# Patient Record
Sex: MALE | Race: WHITE | NOT HISPANIC OR LATINO
[De-identification: names, ages, dates, MRNs, and addresses within clinical notes are randomized per-mention and may not be internally consistent; named-entity substitution may affect disease eponyms.]

---

## 2020-07-22 ENCOUNTER — APPOINTMENT (OUTPATIENT)
Dept: OPHTHALMOLOGY | Facility: CLINIC | Age: 85
End: 2020-07-22
Payer: MEDICARE

## 2020-07-22 ENCOUNTER — NON-APPOINTMENT (OUTPATIENT)
Age: 85
End: 2020-07-22

## 2020-07-22 PROCEDURE — 92133 CPTRZD OPH DX IMG PST SGM ON: CPT

## 2020-07-22 PROCEDURE — 92014 COMPRE OPH EXAM EST PT 1/>: CPT | Mod: 25

## 2020-07-22 PROCEDURE — 92202 OPSCPY EXTND ON/MAC DRAW: CPT

## 2020-07-22 PROCEDURE — 68761 CLOSE TEAR DUCT OPENING: CPT | Mod: E2,E4

## 2020-11-10 ENCOUNTER — NON-APPOINTMENT (OUTPATIENT)
Age: 85
End: 2020-11-10

## 2020-11-10 ENCOUNTER — APPOINTMENT (OUTPATIENT)
Dept: OPHTHALMOLOGY | Facility: CLINIC | Age: 85
End: 2020-11-10
Payer: MEDICARE

## 2020-11-10 PROCEDURE — 92012 INTRM OPH EXAM EST PATIENT: CPT

## 2020-11-10 PROCEDURE — 92083 EXTENDED VISUAL FIELD XM: CPT

## 2021-03-12 ENCOUNTER — APPOINTMENT (OUTPATIENT)
Dept: OPHTHALMOLOGY | Facility: CLINIC | Age: 86
End: 2021-03-12

## 2021-04-22 ENCOUNTER — APPOINTMENT (OUTPATIENT)
Dept: OPHTHALMOLOGY | Facility: CLINIC | Age: 86
End: 2021-04-22
Payer: MEDICARE

## 2021-04-22 ENCOUNTER — NON-APPOINTMENT (OUTPATIENT)
Age: 86
End: 2021-04-22

## 2021-04-22 PROCEDURE — 68761 CLOSE TEAR DUCT OPENING: CPT | Mod: E2,E4

## 2021-04-22 PROCEDURE — 92202 OPSCPY EXTND ON/MAC DRAW: CPT

## 2021-04-22 PROCEDURE — 92014 COMPRE OPH EXAM EST PT 1/>: CPT | Mod: 25

## 2021-04-22 PROCEDURE — 92133 CPTRZD OPH DX IMG PST SGM ON: CPT

## 2021-05-17 ENCOUNTER — APPOINTMENT (OUTPATIENT)
Dept: OPHTHALMOLOGY | Facility: CLINIC | Age: 86
End: 2021-05-17
Payer: MEDICARE

## 2021-05-17 ENCOUNTER — NON-APPOINTMENT (OUTPATIENT)
Age: 86
End: 2021-05-17

## 2021-05-17 PROCEDURE — 92083 EXTENDED VISUAL FIELD XM: CPT

## 2021-05-17 PROCEDURE — 92012 INTRM OPH EXAM EST PATIENT: CPT

## 2021-05-26 ENCOUNTER — EMERGENCY (EMERGENCY)
Facility: HOSPITAL | Age: 86
LOS: 1 days | Discharge: HOME | End: 2021-05-26
Attending: EMERGENCY MEDICINE
Payer: MEDICARE

## 2021-05-26 VITALS
DIASTOLIC BLOOD PRESSURE: 87 MMHG | HEART RATE: 69 BPM | SYSTOLIC BLOOD PRESSURE: 153 MMHG | OXYGEN SATURATION: 99 % | TEMPERATURE: 98 F | RESPIRATION RATE: 18 BRPM

## 2021-05-26 VITALS
WEIGHT: 141.1 LBS | HEIGHT: 65 IN | SYSTOLIC BLOOD PRESSURE: 127 MMHG | RESPIRATION RATE: 18 BRPM | OXYGEN SATURATION: 100 % | DIASTOLIC BLOOD PRESSURE: 69 MMHG | TEMPERATURE: 98 F | HEART RATE: 64 BPM

## 2021-05-26 LAB
ALBUMIN SERPL ELPH-MCNC: 3.7 G/DL — SIGNIFICANT CHANGE UP (ref 3.3–5)
ALP SERPL-CCNC: 46 U/L — SIGNIFICANT CHANGE UP (ref 40–120)
ALT FLD-CCNC: 14 U/L — SIGNIFICANT CHANGE UP (ref 10–45)
ANION GAP SERPL CALC-SCNC: 13 MMOL/L — SIGNIFICANT CHANGE UP (ref 5–17)
APTT BLD: 33.6 SEC — SIGNIFICANT CHANGE UP (ref 27.5–35.5)
AST SERPL-CCNC: 23 U/L — SIGNIFICANT CHANGE UP (ref 10–40)
B PERT IGG+IGM PNL SER: ABNORMAL
BASOPHILS # BLD AUTO: 0.03 K/UL — SIGNIFICANT CHANGE UP (ref 0–0.2)
BASOPHILS NFR BLD AUTO: 0.4 % — SIGNIFICANT CHANGE UP (ref 0–2)
BILIRUB SERPL-MCNC: 0.7 MG/DL — SIGNIFICANT CHANGE UP (ref 0.2–1.2)
BUN SERPL-MCNC: 26 MG/DL — HIGH (ref 7–23)
CALCIUM SERPL-MCNC: 8.9 MG/DL — SIGNIFICANT CHANGE UP (ref 8.4–10.5)
CHLORIDE SERPL-SCNC: 103 MMOL/L — SIGNIFICANT CHANGE UP (ref 96–108)
CO2 SERPL-SCNC: 23 MMOL/L — SIGNIFICANT CHANGE UP (ref 22–31)
COLOR FLD: SIGNIFICANT CHANGE UP
CREAT SERPL-MCNC: 0.83 MG/DL — SIGNIFICANT CHANGE UP (ref 0.5–1.3)
EOSINOPHIL # BLD AUTO: 0.06 K/UL — SIGNIFICANT CHANGE UP (ref 0–0.5)
EOSINOPHIL NFR BLD AUTO: 0.8 % — SIGNIFICANT CHANGE UP (ref 0–6)
FLUID INTAKE SUBSTANCE CLASS: SIGNIFICANT CHANGE UP
GLUCOSE SERPL-MCNC: 88 MG/DL — SIGNIFICANT CHANGE UP (ref 70–99)
HCT VFR BLD CALC: 41 % — SIGNIFICANT CHANGE UP (ref 39–50)
HGB BLD-MCNC: 13.6 G/DL — SIGNIFICANT CHANGE UP (ref 13–17)
IMM GRANULOCYTES NFR BLD AUTO: 0.3 % — SIGNIFICANT CHANGE UP (ref 0–1.5)
INR BLD: 1.26 RATIO — HIGH (ref 0.88–1.16)
LYMPHOCYTES # BLD AUTO: 1.27 K/UL — SIGNIFICANT CHANGE UP (ref 1–3.3)
LYMPHOCYTES # BLD AUTO: 17.9 % — SIGNIFICANT CHANGE UP (ref 13–44)
MCHC RBC-ENTMCNC: 33.1 PG — SIGNIFICANT CHANGE UP (ref 27–34)
MCHC RBC-ENTMCNC: 33.2 GM/DL — SIGNIFICANT CHANGE UP (ref 32–36)
MCV RBC AUTO: 99.8 FL — SIGNIFICANT CHANGE UP (ref 80–100)
MONOCYTES # BLD AUTO: 0.68 K/UL — SIGNIFICANT CHANGE UP (ref 0–0.9)
MONOCYTES NFR BLD AUTO: 9.6 % — SIGNIFICANT CHANGE UP (ref 2–14)
NEUTROPHILS # BLD AUTO: 5.04 K/UL — SIGNIFICANT CHANGE UP (ref 1.8–7.4)
NEUTROPHILS NFR BLD AUTO: 71 % — SIGNIFICANT CHANGE UP (ref 43–77)
NRBC # BLD: 0 /100 WBCS — SIGNIFICANT CHANGE UP (ref 0–0)
PLATELET # BLD AUTO: 146 K/UL — LOW (ref 150–400)
POTASSIUM SERPL-MCNC: 4.3 MMOL/L — SIGNIFICANT CHANGE UP (ref 3.5–5.3)
POTASSIUM SERPL-SCNC: 4.3 MMOL/L — SIGNIFICANT CHANGE UP (ref 3.5–5.3)
PROT SERPL-MCNC: 6.6 G/DL — SIGNIFICANT CHANGE UP (ref 6–8.3)
PROTHROM AB SERPL-ACNC: 14.9 SEC — HIGH (ref 10.6–13.6)
RBC # BLD: 4.11 M/UL — LOW (ref 4.2–5.8)
RBC # FLD: 12.5 % — SIGNIFICANT CHANGE UP (ref 10.3–14.5)
RCV VOL RI: HIGH /UL (ref 0–0)
SODIUM SERPL-SCNC: 139 MMOL/L — SIGNIFICANT CHANGE UP (ref 135–145)
SYNOVIAL CRYSTALS CLARITY: ABNORMAL
SYNOVIAL CRYSTALS COLOR: ABNORMAL
SYNOVIAL CRYSTALS ID: SIGNIFICANT CHANGE UP
SYNOVIAL CRYSTALS TUBE: SIGNIFICANT CHANGE UP
TOTAL NUCLEATED CELL COUNT, BODY FLUID: 1111 /UL — SIGNIFICANT CHANGE UP
TUBE TYPE: SIGNIFICANT CHANGE UP
WBC # BLD: 7.1 K/UL — SIGNIFICANT CHANGE UP (ref 3.8–10.5)
WBC # FLD AUTO: 7.1 K/UL — SIGNIFICANT CHANGE UP (ref 3.8–10.5)

## 2021-05-26 PROCEDURE — 87075 CULTR BACTERIA EXCEPT BLOOD: CPT

## 2021-05-26 PROCEDURE — 85025 COMPLETE CBC W/AUTO DIFF WBC: CPT

## 2021-05-26 PROCEDURE — 99284 EMERGENCY DEPT VISIT MOD MDM: CPT | Mod: 25

## 2021-05-26 PROCEDURE — 87070 CULTURE OTHR SPECIMN AEROBIC: CPT

## 2021-05-26 PROCEDURE — 99284 EMERGENCY DEPT VISIT MOD MDM: CPT | Mod: GC

## 2021-05-26 PROCEDURE — 86140 C-REACTIVE PROTEIN: CPT

## 2021-05-26 PROCEDURE — 73564 X-RAY EXAM KNEE 4 OR MORE: CPT

## 2021-05-26 PROCEDURE — 89060 EXAM SYNOVIAL FLUID CRYSTALS: CPT

## 2021-05-26 PROCEDURE — 85730 THROMBOPLASTIN TIME PARTIAL: CPT

## 2021-05-26 PROCEDURE — 80053 COMPREHEN METABOLIC PANEL: CPT

## 2021-05-26 PROCEDURE — 20610 DRAIN/INJ JOINT/BURSA W/O US: CPT | Mod: RT

## 2021-05-26 PROCEDURE — 85652 RBC SED RATE AUTOMATED: CPT

## 2021-05-26 PROCEDURE — 87205 SMEAR GRAM STAIN: CPT

## 2021-05-26 PROCEDURE — 85610 PROTHROMBIN TIME: CPT

## 2021-05-26 PROCEDURE — 73564 X-RAY EXAM KNEE 4 OR MORE: CPT | Mod: 26,RT

## 2021-05-26 PROCEDURE — 89051 BODY FLUID CELL COUNT: CPT

## 2021-05-26 NOTE — ED PROVIDER NOTE - CLINICAL SUMMARY MEDICAL DECISION MAKING FREE TEXT BOX
Presenting with right knee pain. S/p arthroplasty 5 years ago.   Plan: xray right knee, labs, ortho consult Presenting with right knee pain. S/p arthroplasty 5 years ago.   Plan: xray right knee, labs, ortho consult    Attending Statement: Agree with the above.  Atraumatic R knee effusion, on AC for afib, s/p tka 5 yr ago likely 2/2 hemarthrosis.  No concern for septic joint.  Joint is tight, very limited range of motion, will need therapeutic arthrocentesis after XR to ensure no occult periprosthetic fx or dislocation (very low concern for both).  --BMM

## 2021-05-26 NOTE — ED PROVIDER NOTE - NSFOLLOWUPINSTRUCTIONS_ED_ALL_ED_FT
You were evaluated in the Emergency Department for [INSERT CC HERE].  You were evaluated and examined by a physician, and you had [LIST: LABS, XRAYS, CT, US, ETC].      Based on your evaluation:___________     There are no signs of emergency conditions requiring admission to the hospital on today's workup.  Based on the evaluation, a presumptive diagnosis was made, however, further evaluation may be required by your primary care physician or a specialist for a more definitive diagnosis.  Therefore, please follow-up as directed or return to the Emergency Department if your symptoms change or worsen.    We recommend that you:  1. See your primary care physician within the next 72 hours for follow up.  Bring a copy of your discharge paperwork (including any test results) to your doctor.  2. Please take 500mg Tylenol every 8 hours as needed for pain  3. Please keep the Ace wrap on unless showering  4. Please follow up with your orthopedic Doctor. Dr. Brown within 5-7 days.      *** Return immediately if you have worsening symptoms, worsening pain, chest pain, Shortness of Breath, abdominal pain, Nausea/Vomiting/Diarrhea, dizziness, weakness, confusion, vision changes, urinary symptoms, syncope, falls, trauma, discharge, bleeding, fevers, or any other new/concerning symptoms. ***

## 2021-05-26 NOTE — ED PROVIDER NOTE - MUSCULOSKELETAL, MLM
Right knee: Right knee: moderate effusion noted. Tenderness to medial joint line. Limited ROM secondary to pain, flexion to 90 degrees, extension to 20 degrees. Midline surgical scar at anterior knee. No erythema.

## 2021-05-26 NOTE — CONSULT NOTE ADULT - SUBJECTIVE AND OBJECTIVE BOX
Orthopaedic Surgery Consult Note    Pt is a 89y male presenting with R knee pain of 3 hour duration. Pt was standing at a store earlier today and felt immediate pain in his right knee. Pt has had R TKA with Dr. Armstrong 5 years ago and L TKA with Dr. Lepe about 10 years ago. Pt also takes Eliquis for afib, but states he hasn't been taking it as frequently because it's expensive. Pt is a community ambulator at baseline. Pt has been unable to bear weight on affected extremity since the initiation of knee pain earlier. Pt denies numbness, tingling, or paresthesias in affected limb. Pt denies recent trauma and denies any other orthopaedic injuries at this time. Pt denies pain or involvement of any other joint. Denies fevers, recent illness, dizziness, CP, SOB, N/V, calf pain.    PMHx:  No pertinent family history in first degree relatives    MEWS Score    No pertinent past medical history    HTN (hypertension)    Hypothyroid    Afib    No significant past surgical history    KNEE PAIN    90+    SysAdmin_VisitLink      PSHx:    Allergies:  PC Pen VK (Hives)    Medications:    Social: Denies smoking, EtOH, illicit drug use.    Labs:                        13.6   7.10  )-----------( 146      ( 26 May 2021 20:35 )             41.0     05-26    139  |  103  |  26<H>  ----------------------------<  88  4.3   |  23  |  0.83    Ca    8.9      26 May 2021 20:35    TPro  6.6  /  Alb  3.7  /  TBili  0.7  /  DBili  x   /  AST  23  /  ALT  14  /  AlkPhos  46  05-26    PT/INR - ( 26 May 2021 20:35 )   PT: 14.9 sec;   INR: 1.26 ratio         PTT - ( 26 May 2021 20:35 )  PTT:33.6 sec    Imaging:  XR R Knee - prostheses well aligned without fracture or loosening, large effusion noted    Vitals:    Physical Exam:  Gen: NAD, AAOx3    RLE:  Skin intact  +Effusion  No edema, erythema, ecchymosis  Mild warmth to touch  No gross deformity  TTP about knee  No bony TTP of Hip/Foot/Toes  Unable to SLR due to pain  ROM at knee limited secondary to pain to 0-10 degrees  Tender with terminal ROM of knee  Non-tender with micromotion of knee  Non-tender with logroll  +EHL/FHL/TA/GS  SILT L2-S1  +DP/PT Pulses  Compartments soft and compressible  No calf TTP B/L    Secondary Assessment:  NC/AT, NTTP of clavicles, NTTP of C-,T-,L-Spine, NTTP of Pelvis  UEs: NTTP of Shoulders, Elbows, Wrists, Hands; NT with AROM/PROM of Shoulders, Elbows, Wrists, Hands; AIN/PIN/Med/Uln/Msc/Rad/Ax intact  LLE: Able to SLR, NT with Log Roll, NT with Heel Strike, NTTP of Hip, Knee, Ankle, Foot; NT with AROM/PROM of Hip, Knee, Ankle, Foot; Q/H/Gsc/TA/EHL/FHL intact    Procedure:  Discussed risks, benefits, and alternatives to procedure with patient at bedside. Pt expressed understanding and all questions were answered. Under sterile technique, R knee was prepped and 70cc of bloody fluid was aspirated through lateral suprapatellar approach. Samples of fluid were sent for gram stain, culture, crystals, and cell count. Patient tolerated procedure well. No complications. Pt was placed in a compressive dressing following procedure.     A/P: Pt is a 89y male with R knee hemarthrosis    -Afebrile, no leukocytosis  -Very low likelihood of septic arthritis; uncomplicated hemarthrosis; patient's exam immediately improved upon aspiration, able to flex to 100 degrees without pain  -70cc bloody fluid aspirated at bedside  -FU Gram stain/culture/crystals/cell count  -FU ESR/CRP  -NPO except meds and hold all chemical dvt prophylaxis until cell count resulted  -SCDs  -WBAT RLE  -PT/OT  -Patient may be discharged once cell count results  -Discussed with Dr. Hernandez who agrees with plan    James Parsons D.O.  PGY-2 Orthopaedic Surgery

## 2021-05-26 NOTE — ED PROVIDER NOTE - OBJECTIVE STATEMENT
88 y/o male with a h/o HTN, afib, hypothyroidism, and s/p knee arthoplasty, presenting with right knee pain, which started 3 hours ago. Patient states he was at the grocery store when he started having right knee pain; pt states he did not hear a "pop". Acute onset, moderate severity, localized to medial knee and radiating to lower leg, associated symptoms include right knee swelling. Pt states he was unable to ambulate after the pain started. Denies any LE numbness/tingling, ankle pain, back pain, hip pain, fever, or chills.

## 2021-05-26 NOTE — ED PROVIDER NOTE - PATIENT PORTAL LINK FT
You can access the FollowMyHealth Patient Portal offered by [facility] by registering at the following website: [email or web address]. By joining [employer]’s FollowMyHealth portal, you will also be able to view your health information using other applications (apps) compatible with our system.

## 2021-05-26 NOTE — ED ADULT NURSE NOTE - OBJECTIVE STATEMENT
pt is 85 y/o male A&Ox4 from home coming to the ED after experiencing knee pain at the grocery store.  pt states he was able to bare weight to walk to his car to see if the pain resolved, pt states pain worsened and he called EMS to bring him to [facility]. states he was just standing in line when pain started, pt did not hear a "pop" of his knee, no swelling noted.  PMH includes A fib, HTN, b/l knee replacements. 5 hernia surgeries.  pt takes Eliquis daily.  pt complaints of pain to the back of the knee that travels down to the foot.  pt denies CP, SOB, abd discomfort, abd is soft and non tender, denies fever, chills, n/v/d.

## 2021-05-26 NOTE — ED ADULT NURSE REASSESSMENT NOTE - NS ED NURSE REASSESS COMMENT FT1
Pt states R knee pain has improved after it was aspirated. Is now able to bend and straighten knee/leg without pain or difficultly. Awaiting dispo.

## 2021-05-26 NOTE — ED PROVIDER NOTE - CARE PROVIDER_API CALL
David Brown  ORTHOPAEDIC SURGERY  [address] [place] [address], [address]  [place] [address]  Phone: [phone]  Fax: [phone]  Follow Up Time: 4-6 Days

## 2021-05-27 ENCOUNTER — APPOINTMENT (OUTPATIENT)
Dept: OPHTHALMOLOGY | Facility: CLINIC | Age: 86
End: 2021-05-27
Payer: MEDICARE

## 2021-05-27 ENCOUNTER — NON-APPOINTMENT (OUTPATIENT)
Age: 86
End: 2021-05-27

## 2021-05-27 LAB
EOSINOPHIL # FLD: 2 % — SIGNIFICANT CHANGE UP
FLUID SEGMENTED GRANULOCYTES: 80 % — SIGNIFICANT CHANGE UP
GRAM STN FLD: SIGNIFICANT CHANGE UP
LYMPHOCYTES # FLD: 11 % — SIGNIFICANT CHANGE UP
MONOS+MACROS # FLD: 7 % — SIGNIFICANT CHANGE UP
SPECIMEN SOURCE: SIGNIFICANT CHANGE UP

## 2021-05-27 PROCEDURE — 76514 ECHO EXAM OF EYE THICKNESS: CPT

## 2021-05-27 PROCEDURE — 92012 INTRM OPH EXAM EST PATIENT: CPT

## 2021-06-10 LAB
CULTURE RESULTS: SIGNIFICANT CHANGE UP
SPECIMEN SOURCE: SIGNIFICANT CHANGE UP

## 2021-06-18 ENCOUNTER — INPATIENT (INPATIENT)
Facility: HOSPITAL | Age: 86
LOS: 0 days | Discharge: HOME | DRG: 554 | End: 2021-06-19
Attending: HOSPITALIST | Admitting: HOSPITALIST
Payer: COMMERCIAL

## 2021-06-18 VITALS
HEART RATE: 63 BPM | TEMPERATURE: 97 F | HEIGHT: 65 IN | OXYGEN SATURATION: 98 % | DIASTOLIC BLOOD PRESSURE: 78 MMHG | SYSTOLIC BLOOD PRESSURE: 194 MMHG | RESPIRATION RATE: 20 BRPM | WEIGHT: 139.99 LBS

## 2021-06-18 LAB
ALBUMIN SERPL ELPH-MCNC: 4.1 G/DL — SIGNIFICANT CHANGE UP (ref 3.3–5)
ALP SERPL-CCNC: 48 U/L — SIGNIFICANT CHANGE UP (ref 40–120)
ALT FLD-CCNC: 14 U/L — SIGNIFICANT CHANGE UP (ref 10–45)
ANION GAP SERPL CALC-SCNC: 11 MMOL/L — SIGNIFICANT CHANGE UP (ref 5–17)
APTT BLD: 31.2 SEC — SIGNIFICANT CHANGE UP (ref 27.5–35.5)
AST SERPL-CCNC: 22 U/L — SIGNIFICANT CHANGE UP (ref 10–40)
BASOPHILS # BLD AUTO: 0.05 K/UL — SIGNIFICANT CHANGE UP (ref 0–0.2)
BASOPHILS NFR BLD AUTO: 0.6 % — SIGNIFICANT CHANGE UP (ref 0–2)
BILIRUB SERPL-MCNC: 0.8 MG/DL — SIGNIFICANT CHANGE UP (ref 0.2–1.2)
BUN SERPL-MCNC: 19 MG/DL — SIGNIFICANT CHANGE UP (ref 7–23)
CALCIUM SERPL-MCNC: 9 MG/DL — SIGNIFICANT CHANGE UP (ref 8.4–10.5)
CHLORIDE SERPL-SCNC: 104 MMOL/L — SIGNIFICANT CHANGE UP (ref 96–108)
CO2 SERPL-SCNC: 25 MMOL/L — SIGNIFICANT CHANGE UP (ref 22–31)
CREAT SERPL-MCNC: 0.82 MG/DL — SIGNIFICANT CHANGE UP (ref 0.5–1.3)
EOSINOPHIL # BLD AUTO: 0.07 K/UL — SIGNIFICANT CHANGE UP (ref 0–0.5)
EOSINOPHIL NFR BLD AUTO: 0.8 % — SIGNIFICANT CHANGE UP (ref 0–6)
GLUCOSE SERPL-MCNC: 105 MG/DL — HIGH (ref 70–99)
HCT VFR BLD CALC: 40.4 % — SIGNIFICANT CHANGE UP (ref 39–50)
HGB BLD-MCNC: 13.5 G/DL — SIGNIFICANT CHANGE UP (ref 13–17)
IMM GRANULOCYTES NFR BLD AUTO: 0.5 % — SIGNIFICANT CHANGE UP (ref 0–1.5)
INR BLD: 1.15 RATIO — SIGNIFICANT CHANGE UP (ref 0.88–1.16)
LYMPHOCYTES # BLD AUTO: 2.04 K/UL — SIGNIFICANT CHANGE UP (ref 1–3.3)
LYMPHOCYTES # BLD AUTO: 24.1 % — SIGNIFICANT CHANGE UP (ref 13–44)
MCHC RBC-ENTMCNC: 33.3 PG — SIGNIFICANT CHANGE UP (ref 27–34)
MCHC RBC-ENTMCNC: 33.4 GM/DL — SIGNIFICANT CHANGE UP (ref 32–36)
MCV RBC AUTO: 99.5 FL — SIGNIFICANT CHANGE UP (ref 80–100)
MONOCYTES # BLD AUTO: 0.6 K/UL — SIGNIFICANT CHANGE UP (ref 0–0.9)
MONOCYTES NFR BLD AUTO: 7.1 % — SIGNIFICANT CHANGE UP (ref 2–14)
NEUTROPHILS # BLD AUTO: 5.66 K/UL — SIGNIFICANT CHANGE UP (ref 1.8–7.4)
NEUTROPHILS NFR BLD AUTO: 66.9 % — SIGNIFICANT CHANGE UP (ref 43–77)
NRBC # BLD: 0 /100 WBCS — SIGNIFICANT CHANGE UP (ref 0–0)
PLATELET # BLD AUTO: 158 K/UL — SIGNIFICANT CHANGE UP (ref 150–400)
POTASSIUM SERPL-MCNC: 4.1 MMOL/L — SIGNIFICANT CHANGE UP (ref 3.5–5.3)
POTASSIUM SERPL-SCNC: 4.1 MMOL/L — SIGNIFICANT CHANGE UP (ref 3.5–5.3)
PROT SERPL-MCNC: 7 G/DL — SIGNIFICANT CHANGE UP (ref 6–8.3)
PROTHROM AB SERPL-ACNC: 13.7 SEC — HIGH (ref 10.6–13.6)
RBC # BLD: 4.06 M/UL — LOW (ref 4.2–5.8)
RBC # FLD: 12.5 % — SIGNIFICANT CHANGE UP (ref 10.3–14.5)
SODIUM SERPL-SCNC: 140 MMOL/L — SIGNIFICANT CHANGE UP (ref 135–145)
WBC # BLD: 8.46 K/UL — SIGNIFICANT CHANGE UP (ref 3.8–10.5)
WBC # FLD AUTO: 8.46 K/UL — SIGNIFICANT CHANGE UP (ref 3.8–10.5)

## 2021-06-18 PROCEDURE — 73562 X-RAY EXAM OF KNEE 3: CPT | Mod: 26,RT

## 2021-06-18 PROCEDURE — 99285 EMERGENCY DEPT VISIT HI MDM: CPT | Mod: GC

## 2021-06-18 PROCEDURE — 93010 ELECTROCARDIOGRAM REPORT: CPT

## 2021-06-18 RX ORDER — ACETAMINOPHEN 500 MG
975 TABLET ORAL ONCE
Refills: 0 | Status: COMPLETED | OUTPATIENT
Start: 2021-06-18 | End: 2021-06-18

## 2021-06-18 RX ORDER — AMLODIPINE BESYLATE 2.5 MG/1
5 TABLET ORAL ONCE
Refills: 0 | Status: COMPLETED | OUTPATIENT
Start: 2021-06-18 | End: 2021-06-18

## 2021-06-18 RX ORDER — MORPHINE SULFATE 50 MG/1
2 CAPSULE, EXTENDED RELEASE ORAL ONCE
Refills: 0 | Status: DISCONTINUED | OUTPATIENT
Start: 2021-06-18 | End: 2021-06-18

## 2021-06-18 RX ADMIN — MORPHINE SULFATE 2 MILLIGRAM(S): 50 CAPSULE, EXTENDED RELEASE ORAL at 23:23

## 2021-06-18 RX ADMIN — AMLODIPINE BESYLATE 5 MILLIGRAM(S): 2.5 TABLET ORAL at 22:32

## 2021-06-18 RX ADMIN — Medication 975 MILLIGRAM(S): at 23:24

## 2021-06-18 NOTE — ED ADULT NURSE REASSESSMENT NOTE - NS ED NURSE REASSESS COMMENT FT1
Received report from Jannie BRAND. Patient resting in gold salter 6. Patient taken off bed pan. Patient had 1 large brown BM. VSS. A&Ox4. Patient denies new onset of pain. Patient given pain medication as ordered.  IV patent and flows without difficulty. Soiled sheets changed. Patient cleaned and repositioned in bed. Stretcher in lowest position. Instructed to call RN if anything is needed. Side rails up. Pending medication to take effect to attempt to walk patient as per MD order.

## 2021-06-18 NOTE — ED ADULT NURSE NOTE - OBJECTIVE STATEMENT
89 y/o male with PMH HTN, hypothyroid, afib, non-compliant on meds, BIBEMS presenting to ED for right knee pain that started this afternoon. Per pt, the same thing happened 1 month ago and fluid was aspirated. Today, after swimming he felt pain and swelling to his right knee, unable to bear weight. Upon exam pt A&Ox3 gross neuro intact,  no difficulty speaking in complete sentences, s1s2 heart sounds heard, pulses x 4, aguilera x4, abdomen soft nontender nondistended, swelling to right knee noted. Pt denies chest pain, sob, ha, n/v/d, abdominal pain, f/c, urinary symptoms, hematuria.

## 2021-06-18 NOTE — ED PROVIDER NOTE - CLINICAL SUMMARY MEDICAL DECISION MAKING FREE TEXT BOX
89yo male with pmh hypothyroidism, htn, afib on eliquis, b/l TKR, presenting with R knee effusion.  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  Likely reaccumulation of previous hemarthrosis drained here by orthopedics.  Will get xrays to r/o fracture and discuss with ortho. 89yo male with pmh hypothyroidism, htn, afib on eliquis, b/l TKR, presenting with R knee effusion.  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  Likely reaccumulation of previous hemarthrosis drained here by orthopedics.  Will get xrays to r/o fracture and discuss with ortho. ZR

## 2021-06-18 NOTE — ED ADULT TRIAGE NOTE - CHIEF COMPLAINT QUOTE
As per patient and EMS, pt was swimming and when getting out of pool, pt's right knee was swollen. Pt states that he had knee swelling drained a few weeks ago. Pt denies fevers, chills, or trauma to area.

## 2021-06-18 NOTE — CONSULT NOTE ADULT - SUBJECTIVE AND OBJECTIVE BOX
Orthopaedic Surgery Consult Note    Pt is a 89y male presenting with R knee pain that began earlier today. Pt was swimming earlier today, got out -and was walking and felt gradual onset of pain in his right knee. Pt has had R TKA with Dr. Armstrong 5 years ago and L TKA with Dr. Lepe about 10 years ago. Pt also takes Eliquis for afib, but states he hasn't been taking it as frequently because it's expensive. Pt is a community ambulator at baseline. Pt has been unable to bear weight on affected extremity since the initiation of knee pain earlier. Patient states the his happened 3 weeks ago which led him to come into the ED as well. He was aspirated showing hemarthrosis and no growth of bacteria. patient FU in the office and had xrays that were negative. This is the first episode of reaccumulation since. Pt denies numbness, tingling, or paresthesias in affected limb. Pt denies recent trauma and denies any other orthopaedic injuries at this time. Pt denies pain or involvement of any other joint. Denies fevers, recent illness, dizziness, CP, SOB, N/V, calf pain.    PMHx:  No pertinent family history in first degree relatives    MEWS Score    No pertinent past medical history    HTN (hypertension)    Hypothyroid    Afib    No significant past surgical history    KNEE PAIN    90+    SysAdmin_VisitLink      PSHx:    Allergies:  PC Pen VK (Hives)    Medications:    Social: Denies smoking, EtOH, illicit drug use.    Vital Signs Last 24 Hrs  T(C): 36.8 (18 Jun 2021 18:51), Max: 36.8 (18 Jun 2021 18:51)  T(F): 98.3 (18 Jun 2021 18:51), Max: 98.3 (18 Jun 2021 18:51)  HR: 60 (18 Jun 2021 18:51) (60 - 63)  BP: 193/95 (18 Jun 2021 19:49) (172/91 - 194/78)  BP(mean): --  RR: 19 (18 Jun 2021 18:51) (19 - 20)  SpO2: 100% (18 Jun 2021 18:51) (98% - 100%)    Imaging:  XR R Knee - prostheses well aligned without fracture or loosening, effusion noted    Vitals:    Physical Exam:  Gen: NAD, AAOx3    RLE:  Skin intact  +Effusion  No edema, erythema, ecchymosis  Mild warmth to touch  No gross deformity  TTP about knee  No bony TTP of Hip/Foot/Toes  Unable to SLR due to pain  ROM at knee limited secondary to pain to 0-10 degrees  Tender with terminal ROM of knee  Non-tender with micromotion of knee  Non-tender with logroll  +EHL/FHL/TA/GS  SILT L2-S1  +DP/PT Pulses  Compartments soft and compressible  No calf TTP B/L    Secondary Survey:   No TTP over bony prominences, SILT, palpable pulses, full/painless A/PROM, compartments soft. No TTP over spinous processes or paraspinal muscles at C/T/L spine. No palpable step off. No other injuries or complaints.       A/P: Pt is a 89y male with likely R knee hemarthrosis    -Afebrile, no leukocytosis  -Very low likelihood of septic arthritis; uncomplicated hemarthrosis; repeat episode from prior in May  -WBAT RLE with compressive ace  -Recommend pain control with Ofirmev and sent home on oral meds  -PT/OT  -Ortho stable for DC  -FU in the office with the patients surgeon on Monday  -Discussed with Dr. Strong who agrees with plan

## 2021-06-18 NOTE — ED PROVIDER NOTE - PHYSICAL EXAMINATION
General appearance: NAD, conversant, afebrile    Neck: Trachea midline; Full range of motion, supple   Pulm: CTA bl, normal respiratory effort and no intercostal retractions, normal work of breathing   CV: RRR, No murmurs, rubs, or gallops   Abdomen: Soft, non-tender, non-distended; no guarding or rebound   Extremities: Large effusion r knee, no other peripheral edema, minimally ttp over knee, gross sensation intact, MS 5/5 b/l    Skin: Dry, normal temperature, turgor and texture; no rash, no warmth, erythema over r knee   Psych: Appropriate affect, cooperative; alert and oriented to person, place and time

## 2021-06-18 NOTE — ED PROVIDER NOTE - CARE PLAN
Principal Discharge DX:	Knee pain   Principal Discharge DX:	Knee effusion, right  Secondary Diagnosis:	Acute pain of right knee

## 2021-06-18 NOTE — ED PROVIDER NOTE - PROGRESS NOTE DETAILS
Ortho contacted, will see patient in ED shortly. Patient signed out to me by the prior attending.  The patient's disposition was discussed with the treating team and agreed upon.  Daniel Olguin M.D. (attending) patient unable to walk safely and feels unsafe with discharge home, Orthopedic team does not wish to drain believed hemarthrosis secondary to concern for risk of inducing infection, patient placed in ACE wrap by orthopedics and patient will require admission for PT and home PT prn.

## 2021-06-18 NOTE — ED PROVIDER NOTE - OBJECTIVE STATEMENT
89yo male with pmh hypothyroidism, htn, afib on eliquis, b/l TKR, presenting with R knee swelling.  Patient states he was swimming earlier today and when he got out of the pool he noticed pain and swelling of knee.  No fevers, was otherwise feeling well prior to this.  Has been having difficulty weight bearing since and came to ED because 4 weeks ago had similar problem which was drained with symptomatic relief.  Endorses poor compliance with eliquis.

## 2021-06-19 ENCOUNTER — TRANSCRIPTION ENCOUNTER (OUTPATIENT)
Age: 86
End: 2021-06-19

## 2021-06-19 VITALS — SYSTOLIC BLOOD PRESSURE: 155 MMHG | RESPIRATION RATE: 18 BRPM | HEART RATE: 68 BPM | DIASTOLIC BLOOD PRESSURE: 72 MMHG

## 2021-06-19 DIAGNOSIS — Z29.9 ENCOUNTER FOR PROPHYLACTIC MEASURES, UNSPECIFIED: ICD-10-CM

## 2021-06-19 DIAGNOSIS — M25.461 EFFUSION, RIGHT KNEE: ICD-10-CM

## 2021-06-19 DIAGNOSIS — I48.91 UNSPECIFIED ATRIAL FIBRILLATION: ICD-10-CM

## 2021-06-19 DIAGNOSIS — M25.561 PAIN IN RIGHT KNEE: ICD-10-CM

## 2021-06-19 DIAGNOSIS — M25.00 HEMARTHROSIS, UNSPECIFIED JOINT: ICD-10-CM

## 2021-06-19 DIAGNOSIS — E03.9 HYPOTHYROIDISM, UNSPECIFIED: ICD-10-CM

## 2021-06-19 DIAGNOSIS — Z79.899 OTHER LONG TERM (CURRENT) DRUG THERAPY: ICD-10-CM

## 2021-06-19 DIAGNOSIS — I10 ESSENTIAL (PRIMARY) HYPERTENSION: ICD-10-CM

## 2021-06-19 LAB — SARS-COV-2 RNA SPEC QL NAA+PROBE: SIGNIFICANT CHANGE UP

## 2021-06-19 PROCEDURE — 97161 PT EVAL LOW COMPLEX 20 MIN: CPT

## 2021-06-19 PROCEDURE — 99284 EMERGENCY DEPT VISIT MOD MDM: CPT | Mod: 25

## 2021-06-19 PROCEDURE — 85730 THROMBOPLASTIN TIME PARTIAL: CPT

## 2021-06-19 PROCEDURE — 85025 COMPLETE CBC W/AUTO DIFF WBC: CPT

## 2021-06-19 PROCEDURE — 99223 1ST HOSP IP/OBS HIGH 75: CPT

## 2021-06-19 PROCEDURE — 96374 THER/PROPH/DIAG INJ IV PUSH: CPT

## 2021-06-19 PROCEDURE — 87635 SARS-COV-2 COVID-19 AMP PRB: CPT

## 2021-06-19 PROCEDURE — 85610 PROTHROMBIN TIME: CPT

## 2021-06-19 PROCEDURE — 80053 COMPREHEN METABOLIC PANEL: CPT

## 2021-06-19 PROCEDURE — 73562 X-RAY EXAM OF KNEE 3: CPT

## 2021-06-19 RX ORDER — ACETAMINOPHEN 500 MG
975 TABLET ORAL EVERY 6 HOURS
Refills: 0 | Status: DISCONTINUED | OUTPATIENT
Start: 2021-06-19 | End: 2021-06-19

## 2021-06-19 RX ORDER — AMLODIPINE BESYLATE 2.5 MG/1
5 TABLET ORAL DAILY
Refills: 0 | Status: DISCONTINUED | OUTPATIENT
Start: 2021-06-19 | End: 2021-06-19

## 2021-06-19 RX ORDER — APIXABAN 2.5 MG/1
1 TABLET, FILM COATED ORAL
Qty: 0 | Refills: 0 | DISCHARGE

## 2021-06-19 RX ORDER — OXYCODONE HYDROCHLORIDE 5 MG/1
5 TABLET ORAL EVERY 6 HOURS
Refills: 0 | Status: DISCONTINUED | OUTPATIENT
Start: 2021-06-19 | End: 2021-06-19

## 2021-06-19 RX ORDER — ACETAMINOPHEN 500 MG
3 TABLET ORAL
Qty: 0 | Refills: 0 | DISCHARGE
Start: 2021-06-19

## 2021-06-19 RX ORDER — OXYCODONE HYDROCHLORIDE 5 MG/1
1 TABLET ORAL
Qty: 8 | Refills: 0
Start: 2021-06-19 | End: 2021-06-22

## 2021-06-19 NOTE — DISCHARGE NOTE NURSING/CASE MANAGEMENT/SOCIAL WORK - NSDCPNDISPN_GEN_ALL_CORE
Education provided on the pain management plan of care/Activities of daily living, including home environment that might     exacerbate pain or reduce effectiveness of the pain management plan of care as well as strategies to address these issues/Opioids not applicable/not prescribed

## 2021-06-19 NOTE — H&P ADULT - HISTORY OF PRESENT ILLNESS
89y male presenting with R knee pain that began earlier today. Pt was swimming earlier today, got out -and was walking and felt gradual onset of pain in his right knee. Pt has had R TKA with Dr. Armstrong 5 years ago and L TKA with Dr. Lepe about 10 years ago. Pt also takes Eliquis for afib, but states he hasn't been taking it as frequently because it's expensive. Pt is a community ambulator at baseline. Pt has been unable to bear weight on affected extremity since the initiation of knee pain earlier. Patient states the his happened 3 weeks ago which led him to come into the ED as well. He was aspirated showing hemarthrosis and no growth of bacteria. patient FU in the office and had xrays that were negative. This is the first episode of reaccumulation since. Pt denies numbness, tingling, or paresthesias in affected limb. Pt denies recent trauma and denies any other orthopaedic injuries at this time. Pt denies pain or involvement of any other joint. Denies fevers, recent illness, dizziness, CP, SOB, N/V, calf pain.    In ED   Vital Signs Last 24 Hrs  T(C): 36.4 (19 Jun 2021 03:27), Max: 36.9 (19 Jun 2021 02:33)  T(F): 97.6 (19 Jun 2021 03:27), Max: 98.4 (19 Jun 2021 02:33)  HR: 61 (19 Jun 2021 03:27) (60 - 63)  BP: 114/61 (19 Jun 2021 03:27) (107/64 - 194/78)  BP(mean): --  RR: 18 (19 Jun 2021 03:27) (16 - 20)  SpO2: 100% (19 Jun 2021 03:27) (97% - 100%)    In ED given morphine 2 mg, amlodipine, tylenol.  89y male presenting with R knee pain after swimming earlier today, got out -and was walking and felt gradual onset of pain in his right knee, which progressed rapidly and was not able to drive home. Pt has had R TKA with Dr. Armstrong 5 years ago and L TKA with Dr. Lepe about 10 years ago. Pt also takes Eliquis for afib, but states he hasn't been taking it as frequently because it's expensive. He  unable to bear weight on affected extremity since the initiation of knee pain earlier. He had similar episode 3 weeks ago, during which the knee was aspirated showing hemarthrosis and no growth of bacteria. patient FU in the office and had xrays that were negative. Pt denies numbness, tingling, or paresthesias in affected limb. Pt denies recent trauma and denies any other orthopaedic injuries at this time. Pt denies pain or involvement of any other joint. Denies fevers, recent illness, dizziness, CP, SOB, N/V, calf pain.  Currently pain is improved after IV morphine and ACE compression. He would like to be discharged so that he can go to a family gathering, but understands that he is being admitted due to concern for safety/inability to ambulate.     In ED   Vital Signs Last 24 Hrs  T(C): 36.4 (19 Jun 2021 03:27), Max: 36.9 (19 Jun 2021 02:33)  T(F): 97.6 (19 Jun 2021 03:27), Max: 98.4 (19 Jun 2021 02:33)  HR: 61 (19 Jun 2021 03:27) (60 - 63)  BP: 114/61 (19 Jun 2021 03:27) (107/64 - 194/78)  BP(mean): --  RR: 18 (19 Jun 2021 03:27) (16 - 20)  SpO2: 100% (19 Jun 2021 03:27) (97% - 100%)    In ED given morphine 2 mg, amlodipine, tylenol.

## 2021-06-19 NOTE — DISCHARGE NOTE PROVIDER - CARE PROVIDER_API CALL
Frida Strong  ORTHOPAEDIC SURGERY  [address], [address]  [place] [address]  Phone: [phone]  Fax: [phone]  Follow Up Time:     Collins Griffin  GASTROENTEROLOGY  [address], [address]  [place], [place] [id number]  Phone: [phone]  Fax: [phone]  Follow Up Time:    Frida Strong  ORTHOPAEDIC SURGERY  [address], [address]  [place] [address]  Phone: [phone]  Fax: [phone]  Follow Up Time:     Collins Griffin  GASTROENTEROLOGY  [address] [place] [address], [address]  [place], [place] [id number]  Phone: [phone]  Fax: [phone]  Follow Up Time:     Syed Thacker)  Cardiovascular Disease; Internal Medicine  [address] [place] [address], [address]  [place] [address]  Phone: [phone]  Fax: [phone]  Follow Up Time:     Vishal Hernandez)  Orthopaedic Surgery  [address], [address]  [place] [address]  Phone: [phone]  Fax: [phone]  Follow Up Time:

## 2021-06-19 NOTE — DISCHARGE NOTE PROVIDER - NSDCMRMEDTOKEN_GEN_ALL_CORE_FT
amLODIPine 5 mg oral tablet: 1 tab(s) orally once a day  Eliquis 5 mg oral tablet: 1 tab(s) orally 2 times a day  levothyroxine 50 mcg (0.05 mg) oral capsule: 1 cap(s) orally once a day  Please recheck Thyroid function in 4 weeks with your PCP  Taina 0.01% ophthalmic solution: 1 drop(s) to the left eye once a day (in the evening)   acetaminophen 325 mg oral tablet: 3 tab(s) orally every 6 hours, As needed, Mild Pain (1 - 3), Moderate Pain (4 - 6)  amLODIPine 5 mg oral tablet: 1 tab(s) orally once a day  Eliquis 5 mg oral tablet: 1 tab(s) orally 2 times a day  levothyroxine 50 mcg (0.05 mg) oral capsule: 1 cap(s) orally once a day  Please recheck Thyroid function in 4 weeks with your PCP  Lumigan 0.01% ophthalmic solution: 1 drop(s) to the left eye once a day (in the evening)  oxyCODONE 5 mg oral tablet: 1 tab(s) orally every 12 hours, As Needed -Severe Pain (7 - 10) MDD:2   acetaminophen 325 mg oral tablet: 3 tab(s) orally every 6 hours, As needed, Mild Pain (1 - 3), Moderate Pain (4 - 6)  amLODIPine 5 mg oral tablet: 1 tab(s) orally once a day  levothyroxine 50 mcg (0.05 mg) oral capsule: 1 cap(s) orally once a day  Please recheck Thyroid function in 4 weeks with your PCP  Lumigan 0.01% ophthalmic solution: 1 drop(s) to the left eye once a day (in the evening)  Out patient PT 2-3 x week for 3 weeks  : Dx - hemarthrosis   ICD - M25.00    oxyCODONE 5 mg oral tablet: 1 tab(s) orally every 12 hours, As Needed -Severe Pain (7 - 10) MDD:2

## 2021-06-19 NOTE — DISCHARGE NOTE NURSING/CASE MANAGEMENT/SOCIAL WORK - PATIENT PORTAL LINK FT
Name band;
You can access the FollowMyHealth Patient Portal offered by [facility] by registering at the following website: [email or web address]. By joining [employer]’s FollowMyHealth portal, you will also be able to view your health information using other applications (apps) compatible with our system.

## 2021-06-19 NOTE — PROGRESS NOTE ADULT - SUBJECTIVE AND OBJECTIVE BOX
Mohsin Khan, MD  Attending Physician, Division Of Hospital Medicine  Pager: [phone], Office: [phone]  Off hour pager: [phone]    Patient is a 89y old  Male who presents with a chief complaint of knee pain     SUBJECTIVE / OVERNIGHT EVENTS:  Seen, examined the patient this am  Sitting on chair, feels ok, still c/o pain ans swelling of right knee. Afebrile, hemodynamically stable     MEDICATIONS  (STANDING):  amLODIPine   Tablet 5 milliGRAM(s) Oral daily    MEDICATIONS  (PRN):  acetaminophen   Tablet .. 975 milliGRAM(s) Oral every 6 hours PRN Mild Pain (1 - 3), Moderate Pain (4 - 6)  oxyCODONE    IR 5 milliGRAM(s) Oral every 6 hours PRN Severe Pain (7 - 10)      Vital Signs Last 24 Hrs  T(C): 36.4 (19 Jun 2021 05:30), Max: 36.9 (19 Jun 2021 02:33)  T(F): 97.5 (19 Jun 2021 05:30), Max: 98.4 (19 Jun 2021 02:33)  HR: 51 (19 Jun 2021 05:30) (51 - 63)  BP: 166/85 (19 Jun 2021 05:30) (107/64 - 194/78)  BP(mean): --  RR: 18 (19 Jun 2021 05:30) (16 - 20)  SpO2: 97% (19 Jun 2021 05:30) (97% - 100%)  CAPILLARY BLOOD GLUCOSE        I&O's Summary    19 Jun 2021 07:01  -  19 Jun 2021 11:52  --------------------------------------------------------  IN: 0 mL / OUT: 300 mL / NET: -300 mL        PHYSICAL EXAM:-  GENERAL: NAD, well-developed  EYES: EOMI, PERRLA, conjunctiva and sclera clear  NECK: Supple, No JVD, no thyromegaly  CHEST/LUNG: Clear to auscultation bilaterally; No wheeze  HEART: Regular rate and rhythm; S1, S2 audible, No murmurs, rubs, or gallops  ABDOMEN: Soft, Nontender, Nondistended; Bowel sounds present  EXTREMITIES:  2+ Peripheral Pulses, No clubbing, cyanosis, Right Knee- swelling, tender on flexion, ACE wraped  NEURO: AAOx3, no focal deficit      LABS:                        13.5   8.46  )-----------( 158      ( 18 Jun 2021 22:26 )             40.4     06-18    140  |  104  |  19  ----------------------------<  105<H>  4.1   |  25  |  0.82    Ca    9.0      18 Jun 2021 22:26    TPro  7.0  /  Alb  4.1  /  TBili  0.8  /  DBili  x   /  AST  22  /  ALT  14  /  AlkPhos  48  06-18    PT/INR - ( 18 Jun 2021 22:26 )   PT: 13.7 sec;   INR: 1.15 ratio      PTT - ( 18 Jun 2021 22:26 )  PTT:31.2 sec      RADIOLOGY & ADDITIONAL TESTS:    Imaging Personally Reviewed: Xray R knee  Consultant(s) Notes Reviewed: Ortho

## 2021-06-19 NOTE — DISCHARGE NOTE PROVIDER - NSDCCPCAREPLAN_GEN_ALL_CORE_FT
PRINCIPAL DISCHARGE DIAGNOSIS  Diagnosis: Hemarthrosis  Assessment and Plan of Treatment: Hemarthrosis - c/o swelling and pain in R knee; atraumatic knee effusion, presumed to be bloody (no tap performed), given hemorrhagic aspiration on last admission for similar complaint a few weeks ago.   - afebrile, WBC normal, low suspicious for septic arthritis  - seen by ortho and rec - WBAT, compression with wrap  - Pain control, PO tylenol  - PT ordered - patient has difficulty ambulating  - s/p effusion aspiration  - need ortho re-eval and followup   ** spoke to wife - Gretchen about the plan /  d/c home after PT sees pt today & out-pt f/u w Ortho. - pt. wants to leave now      SECONDARY DISCHARGE DIAGNOSES  Diagnosis: Acute pain of right knee  Assessment and Plan of Treatment: Acute pain of right knee; Tylenol for mild-moderate / pain meds PRN    Diagnosis: Afib  Assessment and Plan of Treatment: Afib; Rate controlled a.fob - holding a/c initailly for suspected hemarthroses.    Diagnosis: Hypothyroid  Assessment and Plan of Treatment: Hypothyroid; c/w synthroid.        PRINCIPAL DISCHARGE DIAGNOSIS  Diagnosis: Hemarthrosis  Assessment and Plan of Treatment: Hemarthrosis - c/o swelling and pain in R knee; atraumatic knee effusion, presumed to be bloody (no tap performed), given hemorrhagic aspiration on last admission for similar complaint a few weeks ago.   - afebrile, WBC normal, low suspicious for septic arthritis  - seen by ortho and rec - WBAT, compression with wrap  - Pain control, PO tylenol  - PT ordered - patient has difficulty ambulating  - s/p effusion aspiration  - need ortho re-eval and followup   ** spoke to wife - Gretchen about the plan /  d/c home after PT sees pt today & out-pt f/u w Ortho. - pt. wants to leave now      SECONDARY DISCHARGE DIAGNOSES  Diagnosis: Acute pain of right knee  Assessment and Plan of Treatment: Acute pain of right knee; Tylenol for mild-moderate / pain meds PRN    Diagnosis: Afib  Assessment and Plan of Treatment: Afib; Rate controlled afib - holding a/c initailly for suspected hemarthroses.  Hold AC until cleared by PCP - you will need to see the MD on Monday    Diagnosis: Hypothyroid  Assessment and Plan of Treatment: Hypothyroid; c/w synthroid.

## 2021-06-19 NOTE — PROGRESS NOTE ADULT - PROBLEM SELECTOR PLAN 6
Patient was able to confirm eliquis, amlodipine, and lumigan, however is not sure of synthroid dose. He declined offer to call wife overnight and prefers to get the list in the AM. Please clarify med rec.

## 2021-06-19 NOTE — H&P ADULT - PROBLEM SELECTOR PLAN 2
tylenol for mild-moderate  morphine PRN severe pain tylenol for mild-moderate  oxycodone PRN severe pain

## 2021-06-19 NOTE — DISCHARGE NOTE PROVIDER - HOSPITAL COURSE
89y male with hx R knee hemarthrosis in past while on a/c, also hx HTN, hypothyroidism, afib, p/w R knee pain and swelling likely R knee hemarthrosis again.     Hemarthrosis - c/o swelling and pain in R knee; atraumatic knee effusion, presumed to be bloody (no tap performed), given hemorrhagic aspiration on last admission for similar complaint a few weeks ago.   - afebrile, WBC normal, low suspicious for septic arthritis  - Appreciate ortho consult and plan, Rec- WBAT, compression with wrap  - Pain control, PO tylenol, morphine prn  - PT ordered- patient has difficulty ambulating  - If effusion not resolving, consider ortho re-eval for therapeutic aspiration  ** spoke to wife- Gretchen about the plan. d/c home after PT see him today, outpt f/u w Ortho.     Acute pain of right knee; Tylenol for mild-moderate / Oxycodone PRN severe pain.     Afib; Rate controlled a.fob - holding a/c for suspected hemarthroses.     Hypothyroid; c/w synthroid.     HTN (hypertension); BP has been stable  - c/w amlodipine 5 mg with hold parameters.     Medication management;  +able to confirm eliquis, amlodipine, and lumigan, however is not sure of synthroid dose. He declined offer to call wife overnight and prefers to get the list in the AM. Please clarify med rec.    DVT ppx- hold coumadin given concern for bleed. SCD's / PT eval  Dispo - Pt cannot ambulate due to pain. If swelling 89y male with hx R knee hemarthrosis in past while on a/c, also hx HTN, hypothyroidism, afib, p/w R knee pain and swelling likely R knee hemarthrosis again.     Hemarthrosis - c/o swelling and pain in R knee; atraumatic knee effusion, presumed to be bloody (no tap performed), given hemorrhagic aspiration on last admission for similar complaint a few weeks ago.   - afebrile, WBC normal, low suspicious for septic arthritis  - seen by ortho and rec - WBAT, compression with wrap  - Pain control, PO tylenol  - PT ordered - patient has difficulty ambulating  - s/p effusion aspiration  - need ortho re-eval and followup   ** spoke to wife - Gretchen about the plan /  d/c home after PT sees pt today & out-pt f/u w Ortho. - pt. wants to leave now      Acute pain of right knee; Tylenol for mild-moderate / pain meds PRN      Afib; Rate controlled a.fob - holding a/c for suspected hemarthroses.     Hypothyroid; c/w synthroid.     HTN (hypertension); BP has been stable - c/w amlodipine 5 mg with hold parameters.     Medication management;  +able to confirm eliquis, amlodipine, and lumigan, however is not sure of synthroid dose.     DVT ppx- hold coumadin given concern for bleed. SCD's / PT eval  Dispo - Pt cannot ambulate due to pain. If swelling     On 6/19 pt. declined to wait for PT and signed AMA  - d/w Dr. Acevedo 89y male with hx R knee hemarthrosis in past while on a/c, also hx HTN, hypothyroidism, afib, p/w R knee pain and swelling likely R knee hemarthrosis again.     Hemarthrosis - c/o swelling and pain in R knee; atraumatic knee effusion, presumed to be bloody (no tap performed), given hemorrhagic aspiration on last admission for similar complaint a few weeks ago.   - afebrile, WBC normal, low suspicious for septic arthritis  - seen by ortho and rec - WBAT, compression with wrap  - Pain control, PO tylenol  - PT ordered - patient has difficulty ambulating  - s/p effusion aspiration  - need ortho re-eval and followup   ** spoke to wife - Gretchen about the plan /  d/c home after PT sees pt today & out-pt f/u w Ortho. - pt. wants to leave now      Acute pain of right knee; Tylenol for mild-moderate / pain meds PRN      Afib; Rate controlled a.fob - holding a/c for suspected hemarthroses. Hold AC until you are  seen by your PMD     Hypothyroid; c/w synthroid.     HTN (hypertension); BP has been stable - c/w amlodipine 5 mg with hold parameters.     Medication management;  +able to confirm eliquis, amlodipine, and lumigan, however is not sure of synthroid dose.     DVT ppx- holding  coumadin given concern for bleed. SCD's / PT eval  Dispo - Pt can ambulate  - seen by PT      On 6/19 pt. seen by PT and dc'd home - d/w Dr. Acevedo 89y male with hx R knee hemarthrosis in past while on a/c, also hx HTN, hypothyroidism, afib, p/w R knee pain and swelling likely R knee hemarthrosis again.     Hemarthrosis - c/o swelling and pain in R knee; atraumatic knee effusion, presumed to be bloody (no tap performed), given hemorrhagic aspiration on last admission for similar complaint a few weeks ago.   - afebrile, WBC normal, low suspicious for septic arthritis  - seen by ortho and rec - WBAT, compression with wrap  - Pain control, PO tylenol  - PT ordered - patient has difficulty ambulating  - s/p effusion aspiration  - need ortho re-eval and followup   ** spoke to wife - Gretchen about the plan /  d/c home after PT sees pt today & out-pt f/u w Ortho. - pt. wants to leave now      Acute pain of right knee; Tylenol for mild-moderate / pain meds PRN      Afib; Rate controlled a.fob - holding a/c for suspected hemarthroses. Hold AC until you are  seen by your PMD     Hypothyroid; c/w synthroid.     HTN (hypertension); BP has been stable - c/w amlodipine 5 mg with hold parameters.     Medication management;  +able to confirm eliquis, amlodipine, and lumigan, however is not sure of synthroid dose.     DVT ppx- holding  coumadin given concern for bleed. SCD's / PT eval  Dispo - Pt can ambulate  - seen by PT      On 6/19 pt. seen by PT and dc'd home - d/w Dr. Acevedo   Pt. will f/u with Dr. Hernandez  - ortho and Dr. Thacker (PCP) - call and plan to be seen on Monday, 6/21 This is a 89y male with hx R knee hemarthrosis in past while on a/c for chronic a.fib, HTN, hypothyroidism p/w R knee pain and swelling. Imaging of right knee showed moderate effusion. He was evaluated by Orthopedic surgery. Plan of care was outlined as below-     1. Hemarthrosis of right knee- Atraumatic knee effusion, presumed to be hemorrhagic(no tap performed) given hemorrhagic aspiration on last admission for similar complaint a few weeks ago.   - afebrile, WBC normal, low suspicious for septic arthritis  - Appreciate ortho consult and plan, Rec- WBAT, compression with wrap. No plan for arthrocentesis  - Pain control, PT evaluated.  - patient and his wife decided to go home and have outpatient f/u with orthopedic surgery  - AC was on hold.    2. Chronic Afib-  Rate controlled a.fib  - holding a/c for suspected hemarthroses.   - Needs to be re-evaluated by PCP and orthopedic outpatient to resume AC    3. HTN (hypertension).-  Plan: BP has been stable  - c/w amlodipine 5 mg daily     He remained afebrile, hemodynamically stable. Pt. will f/u with Dr. Hernandez  - ortho and Dr. Thacker (PCP) - call and plan to be seen on Monday, 6/21/21 for further care. Wife knows the plan.

## 2021-06-19 NOTE — PROGRESS NOTE ADULT - PROBLEM SELECTOR PLAN 1
C/O swelling and pain in R knee. Atraumatic knee effusion, presumed to be bloody (no tap performed), given hemorrhagic aspiration on last admission for similar complaint a few weeks ago.   - afebrile, WBC normal, low suspicious for septic arthritis  - Appreciate ortho consult and plan, Rec- WBAT, compression with wrap  - Pain control, PO tylenol, morphine prn  - PT ordered- patient has difficulty ambulating  - If effusion not resolving, consider ortho re-eval for therapeutic aspiration  ** spoke to wife- Gretchen about the plan. d/c home after PT see him today, outpt f/u w Ortho

## 2021-06-19 NOTE — H&P ADULT - PROBLEM SELECTOR PLAN 7
diet- DASH  dvt ppx- hold coumadin given concern for bleed. SCD's.   PT eval  dispo - tba. Pt cannot ambulate due to pain. If swelling resolves and patient able to safely ambulate he wishes to leave.

## 2021-06-19 NOTE — H&P ADULT - NSHPPHYSICALEXAM_GEN_ALL_CORE
GENERAL: No acute distress, well-developed  HEAD:  Atraumatic, Normocephalic  ENT: EOMI, PERRLA, conjunctiva and sclera clear, Neck supple, No JVD, moist mucosa, no pharynageal erythema, no tonsillar enlargement or exudate  CHEST/LUNG: Clear to auscultation bilaterally; No wheeze, equal breath sounds bilaterally   HEART: Regular rate and rhythm; No murmurs, rubs, or gallops  ABDOMEN: Soft, Nontender, Nondistended; Bowel sounds present, no organomegaly  EXTREMITIES:  2+ Peripheral Pulses, No clubbing, cyanosis, or edema  R knee with +effusion no edema, erythema or bruising. +TTP. +warmth  PSYCH: AAOx3  NEUROLOGY: non-focal, cranial nerves intact  SKIN: Normal color, No rashes or lesions

## 2021-06-19 NOTE — H&P ADULT - PROBLEM SELECTOR PLAN 6
diet- DASH  dvt ppx- hold coumadin given concern for bleed. SCD's.   PT eval  dispo - tba. Pt cannot ambulate. diet- DASH  dvt ppx- hold coumadin given concern for bleed. SCD's.   PT eval  dispo - tba. Pt cannot ambulate due to pain. If swelling resolves and patient able to safely ambulate he wishes to leave. Patient was able to confirm eliquis, amlodipine, and lumigan, however is not sure of synthroid dose. He declined offer to call wife overnight and prefers to get the list in the AM. Please clarify med rec.

## 2021-06-19 NOTE — H&P ADULT - PROBLEM SELECTOR PLAN 1
Atraumatic knee effusion, presumed to be bloody (no tap performed), given bloody tap on last admission for similar complaint a few weeks ago.   No fevers, WBC, severe pain, or vitals to sugest infection at this time  Appreciate ortho consult  plan for wbat, compression with wrap  Pain control, PO tylenol, morphine prn  PT ordered- patient cannot ambulate  f/u with ortho as outpt Atraumatic knee effusion, presumed to be bloody (no tap performed), given bloody tap on last admission for similar complaint a few weeks ago.   No fevers, WBC, severe pain, or vitals to sugest infection at this time  Appreciate ortho consult  plan for wbat, compression with wrap  Pain control, PO tylenol, morphine prn  PT ordered- patient cannot ambulate  f/u with ortho as outpt  If effusion not resolving, consider ortho re-eval for therapeutic aspiration Atraumatic knee effusion, presumed to be bloody (no tap performed), given hemorrhagic aspiration on last admission for similar complaint a few weeks ago.   No fevers, WBC, severe pain, or vitals to sugest infection at this time  Appreciate ortho consult  plan for wbat, compression with wrap  Pain control, PO tylenol, morphine prn  PT ordered- patient cannot ambulate  f/u with ortho as outpt  If effusion not resolving, consider ortho re-eval for therapeutic aspiration

## 2021-06-19 NOTE — PHYSICAL THERAPY INITIAL EVALUATION ADULT - PERTINENT HX OF CURRENT PROBLEM, REHAB EVAL
89y male with hx R knee hemarthrosis in past while on a/c, also hx HTN, hypothyroidism, afib on eliquis, p/w R knee pain and swelling likely R knee hemarthrosis again
16-Sep-2019

## 2021-06-19 NOTE — DISCHARGE NOTE PROVIDER - NSDCFUADDINST_GEN_ALL_CORE_FT
-Hold off on any anticoagulation until you are cleared by your PCP (primary care MD)   -WBAT RLE with compressive ace  -Tylenol for pain  -PT/OT  -Ortho stable for DC  -FU in the office with the patient's surgeon on Monday  -Discussed with Dr. Strong who agrees with plan   -Hold off on any anticoagulation until you are cleared by your PCP (primary care MD)   -WBAT RLE with compressive ace  -Tylenol for pain  -PT/OT  -Ortho stable for DC  -FU in the office with the patient's surgeon on Monday - Dr. Hernandez   -Discussed with Dr. Strong who agrees with plan

## 2021-06-19 NOTE — H&P ADULT - ASSESSMENT
89y male with hx R knee hemarthrosis in past while on a/c, also hx HTN, hypothyroidism, afib, p/w R knee pain and swelling likely R knee hemarthrosis again.

## 2021-06-19 NOTE — H&P ADULT - NSHPLABSRESULTS_GEN_ALL_CORE
LABS:  CBC Full  -  ( 18 Jun 2021 22:26 )  WBC Count : 8.46 K/uL  Hemoglobin : 13.5 g/dL  Hematocrit : 40.4 %  Platelet Count - Automated : 158 K/uL  Mean Cell Volume : 99.5 fl  Mean Cell Hemoglobin : 33.3 pg  Mean Cell Hemoglobin Concentration : 33.4 gm/dL  Auto Neutrophil # : 5.66 K/uL  Auto Lymphocyte # : 2.04 K/uL  Auto Monocyte # : 0.60 K/uL  Auto Eosinophil # : 0.07 K/uL  Auto Basophil # : 0.05 K/uL  Auto Neutrophil % : 66.9 %  Auto Lymphocyte % : 24.1 %  Auto Monocyte % : 7.1 %  Auto Eosinophil % : 0.8 %  Auto Basophil % : 0.6 %    140    |  104    |  19     ----------------------------<  105<H>    18 Jun 2021 22:26  4.1     |  25     |  0.82         Ca 9.0           18 Jun 2021 22:26        TPro  7.0    /  Alb  4.1    /  TBili  0.8    /  DBili  x      /  AST  22     /  ALT  14     /  AlkPhos  48     18 Jun 2021 22:26    PT/INR - ( 18 Jun 2021 22:26 )   PT: 13.7<H>;   INR: 1.15          PTT - ( 18 Jun 2021 22:26 )  PTT:31.2       < from: Xray Knee 3 Views, Right (06.18.21 @ 20:32) >    FINDINGS:    Status post right knee arthroplasty. Hardware appears intact. No periprosthetic lucencies. No acute fractures. No dislocation. Moderate right knee joint effusion. Vascular calcifications are noted.    IMPRESSION: No acute fracture. Moderate right knee joint effusion.    < end of copied text >

## 2021-06-19 NOTE — PHYSICAL THERAPY INITIAL EVALUATION ADULT - ADDITIONAL COMMENTS
as per pt: PTA pt was living in a PH + Stairs (10 steps to 2nd floor) and was independent in all functional mobility and ADL's. cane for gait. also owns RW. lives with spouse

## 2021-06-19 NOTE — ED ADULT NURSE REASSESSMENT NOTE - NS ED NURSE REASSESS COMMENT FT1
Attempted to walk patient. Patient unable to place weight on right leg. MD Salvador made aware. MD Salvador at bedside of patient. Attempted to walk patient. Patient unable to place weight on right leg. MD San  made aware. MD San at bedside of patient.

## 2021-06-19 NOTE — DISCHARGE NOTE PROVIDER - PROVIDER TOKENS
PROVIDER:[TOKEN:[[id number]]],PROVIDER:[TOKEN:[[id number]]] PROVIDER:[TOKEN:[[id number]]],PROVIDER:[TOKEN:[[id number]]],PROVIDER:[TOKEN:[[id number]]],PROVIDER:[TOKEN:[[id number]]]

## 2021-06-24 ENCOUNTER — APPOINTMENT (OUTPATIENT)
Dept: OPHTHALMOLOGY | Facility: CLINIC | Age: 86
End: 2021-06-24

## 2021-08-17 ENCOUNTER — NON-APPOINTMENT (OUTPATIENT)
Age: 86
End: 2021-08-17

## 2021-08-17 ENCOUNTER — APPOINTMENT (OUTPATIENT)
Dept: OPHTHALMOLOGY | Facility: CLINIC | Age: 86
End: 2021-08-17
Payer: MEDICARE

## 2021-08-17 PROCEDURE — 92012 INTRM OPH EXAM EST PATIENT: CPT

## 2021-08-17 PROCEDURE — 92133 CPTRZD OPH DX IMG PST SGM ON: CPT

## 2021-12-06 ENCOUNTER — OUTPATIENT (OUTPATIENT)
Dept: OUTPATIENT SERVICES | Facility: HOSPITAL | Age: 86
LOS: 1 days | End: 2021-12-06
Payer: MEDICARE

## 2021-12-06 VITALS
OXYGEN SATURATION: 97 % | SYSTOLIC BLOOD PRESSURE: 140 MMHG | DIASTOLIC BLOOD PRESSURE: 80 MMHG | TEMPERATURE: 97 F | HEART RATE: 62 BPM | WEIGHT: 136.03 LBS | RESPIRATION RATE: 16 BRPM | HEIGHT: 63 IN

## 2021-12-06 DIAGNOSIS — Z96.659 PRESENCE OF UNSPECIFIED ARTIFICIAL KNEE JOINT: Chronic | ICD-10-CM

## 2021-12-06 DIAGNOSIS — Z86.69 PERSONAL HISTORY OF OTHER DISEASES OF THE NERVOUS SYSTEM AND SENSE ORGANS: Chronic | ICD-10-CM

## 2021-12-06 DIAGNOSIS — Z98.890 OTHER SPECIFIED POSTPROCEDURAL STATES: Chronic | ICD-10-CM

## 2021-12-06 DIAGNOSIS — M19.90 UNSPECIFIED OSTEOARTHRITIS, UNSPECIFIED SITE: Chronic | ICD-10-CM

## 2021-12-06 DIAGNOSIS — I48.91 UNSPECIFIED ATRIAL FIBRILLATION: ICD-10-CM

## 2021-12-06 DIAGNOSIS — K40.31 UNILATERAL INGUINAL HERNIA, WITH OBSTRUCTION, WITHOUT GANGRENE, RECURRENT: ICD-10-CM

## 2021-12-06 DIAGNOSIS — K40.90 UNILATERAL INGUINAL HERNIA, WITHOUT OBSTRUCTION OR GANGRENE, NOT SPECIFIED AS RECURRENT: ICD-10-CM

## 2021-12-06 DIAGNOSIS — E03.9 HYPOTHYROIDISM, UNSPECIFIED: ICD-10-CM

## 2021-12-06 LAB
ANION GAP SERPL CALC-SCNC: 9 MMOL/L — SIGNIFICANT CHANGE UP (ref 7–14)
BUN SERPL-MCNC: 21 MG/DL — SIGNIFICANT CHANGE UP (ref 7–23)
CALCIUM SERPL-MCNC: 9 MG/DL — SIGNIFICANT CHANGE UP (ref 8.4–10.5)
CHLORIDE SERPL-SCNC: 102 MMOL/L — SIGNIFICANT CHANGE UP (ref 98–107)
CO2 SERPL-SCNC: 28 MMOL/L — SIGNIFICANT CHANGE UP (ref 22–31)
CREAT SERPL-MCNC: 0.87 MG/DL — SIGNIFICANT CHANGE UP (ref 0.5–1.3)
GLUCOSE SERPL-MCNC: 87 MG/DL — SIGNIFICANT CHANGE UP (ref 70–99)
HCT VFR BLD CALC: 41.7 % — SIGNIFICANT CHANGE UP (ref 39–50)
HGB BLD-MCNC: 13.5 G/DL — SIGNIFICANT CHANGE UP (ref 13–17)
MCHC RBC-ENTMCNC: 32.4 GM/DL — SIGNIFICANT CHANGE UP (ref 32–36)
MCHC RBC-ENTMCNC: 33.5 PG — SIGNIFICANT CHANGE UP (ref 27–34)
MCV RBC AUTO: 103.5 FL — HIGH (ref 80–100)
NRBC # BLD: 0 /100 WBCS — SIGNIFICANT CHANGE UP
NRBC # FLD: 0 K/UL — SIGNIFICANT CHANGE UP
PLATELET # BLD AUTO: 167 K/UL — SIGNIFICANT CHANGE UP (ref 150–400)
POTASSIUM SERPL-MCNC: 4.1 MMOL/L — SIGNIFICANT CHANGE UP (ref 3.5–5.3)
POTASSIUM SERPL-SCNC: 4.1 MMOL/L — SIGNIFICANT CHANGE UP (ref 3.5–5.3)
RBC # BLD: 4.03 M/UL — LOW (ref 4.2–5.8)
RBC # FLD: 12.7 % — SIGNIFICANT CHANGE UP (ref 10.3–14.5)
SODIUM SERPL-SCNC: 139 MMOL/L — SIGNIFICANT CHANGE UP (ref 135–145)
WBC # BLD: 6.33 K/UL — SIGNIFICANT CHANGE UP (ref 3.8–10.5)
WBC # FLD AUTO: 6.33 K/UL — SIGNIFICANT CHANGE UP (ref 3.8–10.5)

## 2021-12-06 PROCEDURE — 93010 ELECTROCARDIOGRAM REPORT: CPT

## 2021-12-06 RX ORDER — AMLODIPINE BESYLATE 2.5 MG/1
1 TABLET ORAL
Qty: 0 | Refills: 0 | DISCHARGE

## 2021-12-06 NOTE — H&P PST ADULT - NSANTHOSAYNRD_GEN_A_CORE
No. ERON screening performed.  STOP BANG Legend: 0-2 = LOW Risk; 3-4 = INTERMEDIATE Risk; 5-8 = HIGH Risk

## 2021-12-06 NOTE — H&P PST ADULT - CARDIOVASCULAR COMMENTS
Hx A-fib - pt has not been taking Eliquis for at least 7-8 months - pt unsure but noted 6/21 at [facility] - Dr Keene aware and has told pt to restart today - pt admits to cost being factor Grade 3 - AS - Hx A-fib -

## 2021-12-06 NOTE — H&P PST ADULT - HISTORY OF PRESENT ILLNESS
Pt is a 89 yr old male scheduled for Right Recurrent Inguinal Hernia Repair with Dr colvin male presenting with R knee pain after swimming earlier today, got out -and was walking and felt gradual onset of pain in his right knee, which progressed rapidly and was not able to drive home. Pt has had R TKA with Dr. Armstrong 5 years ago and L TKA with Dr. Lepe about 10 years ago. Pt also takes Eliquis for afib, but states he hasn't been taking it as frequently because it's expensive. He  unable to bear weight on affected extremity since the initiation of knee pain earlier. He had similar episode 3 weeks ago, during which the knee was aspirated showing hemarthrosis and no growth of bacteria. patient FU in the office and had xrays that were negative. Pt denies numbness, tingling, or paresthesias in affected limb. Pt denies recent trauma and denies any other orthopaedic injuries at this time. Pt denies pain or involvement of any other joint. Denies fevers, recent illness, dizziness, CP, SOB, N/V, calf pain.  Currently pain is improved after IV morphine and ACE compression. He would like to be discharged so that he can go to a family gathering, but understands that he is being admitted due to concern for safety/inability to ambulate.     I Pt is a 89 yr old male scheduled for Right Recurrent Inguinal Hernia Repair with Dr Vaughn tentatively 12/16/21 - pt has had prior inguinal hernia repairs - most recent 20 yrs ago and now for recurrence of small bulge in right inguinal region - pt hx a-fib and has not been taking Eliquis since 6/21 when reported in HPI at [facility] - call to Dr Keene and MD notified and aware and told patient to restart today. Pt is poor historian - hx OA , HTN, Hypothyroid   Patient instructed to contact surgeon's office concerning COVID test prior to surgery     I

## 2021-12-06 NOTE — H&P PST ADULT - CARDIOVASCULAR SYMPTOMS
Telephone Encounter by Rosemarie Lofton RN at 07/28/17 01:14 PM     Author:  Rosemarie Lofton RN Service:  (none) Author Type:  Registered Nurse     Filed:  07/28/17 01:15 PM Encounter Date:  7/28/2017 Status:  Signed     :  Rosemarie Lofton RN (Registered Nurse)            To Dr Bergeron,[RR1.1T]    Ok for tablets?[RR1.1M]      Revision History        User Key Date/Time User Provider Type Action    > RR1.1 07/28/17 01:15 PM Rosemarie Lofton, RN Registered Nurse Sign    M - Manual, T - Template             with strenuous activity - hx AS/dyspnea on exertion

## 2021-12-06 NOTE — H&P PST ADULT - PROBLEM SELECTOR PLAN 2
Pt to see Cardio for CC - new referral from PCP Pt to see Cardio for [facility] - new referral from PCP - Results will be evaluated for determining whether patient can be done at [facility] - request Echo

## 2021-12-06 NOTE — H&P PST ADULT - PROBLEM SELECTOR PLAN 1
Pt scheduled for surgery  Right Recurrent Inguinal Hernia Repair with Dr Vaughn tentatively 12/16/21 and preop instructions including instructions for taking Famotidine on the day of surgery, given verbally and with use of  written materials, and patient confirming understanding of such instructions using  teach back method.  Call to PCP and informed of pt not on Eliquis - pt told to restart today - pt to take last dose of Eliquis 2 days preop - 12/13/21 - pt aware - pt to see PCP for [facility]  Call to surgeon and informed of same

## 2021-12-06 NOTE — H&P PST ADULT - NSICDXPASTSURGICALHX_GEN_ALL_CORE_FT
PAST SURGICAL HISTORY:  H/O total knee replacement b/l    H/O: glaucoma     Osteoarthritis      PAST SURGICAL HISTORY:  H/O total knee replacement b/l    H/O: glaucoma     History of esophageal surgery 2006    Osteoarthritis

## 2021-12-06 NOTE — H&P PST ADULT - NSICDXPASTMEDICALHX_GEN_ALL_CORE_FT
PAST MEDICAL HISTORY:  Afib     CAD (coronary artery disease)     H/O inguinal hernia multiple sugeries right and left    H/O: glaucoma     HTN (hypertension)     Hypothyroid     Zenkers diverticulum surgery 2006     PAST MEDICAL HISTORY:  Afib     Aortic stenosis     CAD (coronary artery disease)     H/O inguinal hernia multiple sugeries right and left    H/O: glaucoma     HTN (hypertension)     Hypothyroid     Zenkers diverticulum surgery 2006

## 2021-12-17 NOTE — ASU PATIENT PROFILE, ADULT - NSICDXPASTSURGICALHX_GEN_ALL_CORE_FT
PAST SURGICAL HISTORY:  H/O total knee replacement b/l    H/O: glaucoma     History of esophageal surgery 2006    Osteoarthritis

## 2021-12-17 NOTE — ASU PATIENT PROFILE, ADULT - FALL HARM RISK - UNIVERSAL INTERVENTIONS
Bed in lowest position, wheels locked, appropriate side rails in place/Call bell, personal items and telephone in reach/Instruct patient to call for assistance before getting out of bed or chair/Non-slip footwear when patient is out of bed/[place] to call system/Physically safe environment - no spills, clutter or unnecessary equipment/Purposeful Proactive Rounding/Room/bathroom lighting operational, light cord in reach

## 2021-12-17 NOTE — ASU PATIENT PROFILE, ADULT - NSICDXPASTMEDICALHX_GEN_ALL_CORE_FT
PAST MEDICAL HISTORY:  Afib     Aortic stenosis     CAD (coronary artery disease)     H/O inguinal hernia multiple sugeries right and left    H/O: glaucoma     HTN (hypertension)     Hypothyroid     Zenkers diverticulum surgery 2006

## 2021-12-19 ENCOUNTER — TRANSCRIPTION ENCOUNTER (OUTPATIENT)
Age: 86
End: 2021-12-19

## 2021-12-20 ENCOUNTER — OUTPATIENT (OUTPATIENT)
Dept: OUTPATIENT SERVICES | Facility: HOSPITAL | Age: 86
LOS: 1 days | Discharge: HOME | End: 2021-12-20

## 2021-12-20 VITALS
SYSTOLIC BLOOD PRESSURE: 113 MMHG | RESPIRATION RATE: 14 BRPM | OXYGEN SATURATION: 99 % | DIASTOLIC BLOOD PRESSURE: 54 MMHG | HEART RATE: 77 BPM

## 2021-12-20 VITALS
RESPIRATION RATE: 16 BRPM | HEART RATE: 64 BPM | WEIGHT: 136.03 LBS | DIASTOLIC BLOOD PRESSURE: 70 MMHG | SYSTOLIC BLOOD PRESSURE: 161 MMHG | OXYGEN SATURATION: 99 % | TEMPERATURE: 98 F | HEIGHT: 63 IN

## 2021-12-20 DIAGNOSIS — Z96.659 PRESENCE OF UNSPECIFIED ARTIFICIAL KNEE JOINT: Chronic | ICD-10-CM

## 2021-12-20 DIAGNOSIS — Z86.69 PERSONAL HISTORY OF OTHER DISEASES OF THE NERVOUS SYSTEM AND SENSE ORGANS: Chronic | ICD-10-CM

## 2021-12-20 DIAGNOSIS — K40.31 UNILATERAL INGUINAL HERNIA, WITH OBSTRUCTION, WITHOUT GANGRENE, RECURRENT: ICD-10-CM

## 2021-12-20 DIAGNOSIS — M19.90 UNSPECIFIED OSTEOARTHRITIS, UNSPECIFIED SITE: Chronic | ICD-10-CM

## 2021-12-20 DIAGNOSIS — Z98.890 OTHER SPECIFIED POSTPROCEDURAL STATES: Chronic | ICD-10-CM

## 2021-12-20 RX ORDER — APIXABAN 2.5 MG/1
1 TABLET, FILM COATED ORAL
Qty: 0 | Refills: 0 | DISCHARGE

## 2021-12-20 RX ORDER — FENTANYL CITRATE 50 UG/ML
25 INJECTION INTRAVENOUS
Refills: 0 | Status: DISCONTINUED | OUTPATIENT
Start: 2021-12-20 | End: 2021-12-20

## 2021-12-20 RX ORDER — DORZOLAMIDE HYDROCHLORIDE 20 MG/ML
1 SOLUTION/ DROPS OPHTHALMIC
Qty: 0 | Refills: 0 | DISCHARGE

## 2021-12-20 RX ORDER — AMLODIPINE BESYLATE 2.5 MG/1
1 TABLET ORAL
Qty: 0 | Refills: 0 | DISCHARGE

## 2021-12-20 RX ORDER — OXYCODONE HYDROCHLORIDE 5 MG/1
1 TABLET ORAL
Qty: 8 | Refills: 0
Start: 2021-12-20

## 2021-12-20 RX ORDER — SODIUM CHLORIDE 9 MG/ML
1000 INJECTION, SOLUTION INTRAVENOUS
Refills: 0 | Status: DISCONTINUED | OUTPATIENT
Start: 2021-12-20 | End: 2022-01-03

## 2021-12-20 RX ORDER — LEVOTHYROXINE SODIUM 125 MCG
1 TABLET ORAL
Qty: 0 | Refills: 0 | DISCHARGE

## 2021-12-20 RX ORDER — ONDANSETRON 8 MG/1
4 TABLET, FILM COATED ORAL ONCE
Refills: 0 | Status: DISCONTINUED | OUTPATIENT
Start: 2021-12-20 | End: 2022-01-03

## 2021-12-20 RX ADMIN — SODIUM CHLORIDE 100 MILLILITER(S): 9 INJECTION, SOLUTION INTRAVENOUS at 09:45

## 2021-12-20 NOTE — ASU DISCHARGE PLAN (ADULT/PEDIATRIC) - NS MD DC FALL RISK RISK
For information on Fall & Injury Prevention, visit: [email or web address] OR  [email or web address] OR  https://www.cdc.gov/steadi/patient.html

## 2021-12-20 NOTE — ASU DISCHARGE PLAN (ADULT/PEDIATRIC) - ASU DC SPECIAL INSTRUCTIONSFT
You just had Hernia surgery. Please follow the instructions below to make your recovery as comfortable as possible.    **REST! Apply ice packs to incision sites 20 mins on, 20 mins off every 4 hours for the first 24 hours.    If taking narcotic pain medications, you may take COLACE (OTC stool softener) as directed to prevent constipation.**    1. Depending on the procedure, you may or may not have pain. Please fill any prescriptions you have been given and take as directed.    2. Remove outer dressing (if any) in 24 hours before showering, leave white steri strips underneath in place.    3. You could experience minimal to mild blood staining of your dressing. If bleeding is persistent, but light, apply direct and gentle pressure to the area and lie flat in bed for 10-15 min. If bleeding is persistent and heavy or is associated with clots call the office immediately.    4. If you are unable to urinate after adequate hydration in 8-10 hours, call the office.    5. No lifting >20 lbs for 6 weeks    NOTE: Some swelling and or bruising  near or around the testicles can be expected and is considered normal. If you have any swelling or pain, you can apply ice packs to the area (15 minutes on/ 15 minutes off).     **Please call the office for an appointment when you get home ([phone]). If you have any questions, problems or concerns, do not hesitate to call the office.** You just had Hernia surgery. Please follow the instructions below to make your recovery as comfortable as possible.    **REST! Apply ice packs to incision sites 20 mins on, 20 mins off every 4 hours for the first 24 hours.    If taking narcotic pain medications, you may take COLACE (OTC stool softener) as directed to prevent constipation.**    1. Depending on the procedure, you may or may not have pain. Please fill any prescriptions you have been given and take as directed.    2. Remove outer dressing (if any) in 24 hours before showering, leave white steri strips underneath in place.    3. You could experience minimal to mild blood staining of your dressing. If bleeding is persistent, but light, apply direct and gentle pressure to the area and lie flat in bed for 10-15 min. If bleeding is persistent and heavy or is associated with clots call the office immediately.    4. If you are unable to urinate after adequate hydration in 8-10 hours, call the office.    5. No lifting >20 lbs for 6 weeks    NOTE: Some swelling and or bruising  near or around the testicles can be expected and is considered normal. If you have any swelling or pain, you can apply ice packs to the area (15 minutes on/ 15 minutes off).     **Please call the office for an appointment when you get home ([phone]). If you have any questions, problems or concerns, do not hesitate to call the office.**    Patient can restart eliquis tomorrow

## 2021-12-20 NOTE — ASU DISCHARGE PLAN (ADULT/PEDIATRIC) - NURSING INSTRUCTIONS
no tylenol or acetominophen until after 230pm. Do not take pain medication on an empty stomach.  Increase fluids and fiber in diet to prevent constipation.

## 2021-12-20 NOTE — ASU DISCHARGE PLAN (ADULT/PEDIATRIC) - CARE PROVIDER_API CALL
Lam Vaughn)  ColonRectal Surgery; Surgery  [address] [facility] [address], [address]  [place] [address]  Phone: [phone]  Fax: [phone]  Follow Up Time: 2 weeks

## 2021-12-20 NOTE — BRIEF OPERATIVE NOTE - NSICDXBRIEFPROCEDURE_GEN_ALL_CORE_FT
PROCEDURES:  Repair, hernia, inguinal, open, using mesh, adult 20-Dec-2021 09:01:36  Leonor Mcgregor

## 2022-02-14 ENCOUNTER — APPOINTMENT (OUTPATIENT)
Dept: OPHTHALMOLOGY | Facility: CLINIC | Age: 87
End: 2022-02-14
Payer: MEDICARE

## 2022-02-14 ENCOUNTER — NON-APPOINTMENT (OUTPATIENT)
Age: 87
End: 2022-02-14

## 2022-02-14 PROBLEM — I35.0 NONRHEUMATIC AORTIC (VALVE) STENOSIS: Chronic | Status: ACTIVE | Noted: 2021-12-06

## 2022-02-14 PROBLEM — Z86.69 PERSONAL HISTORY OF OTHER DISEASES OF THE NERVOUS SYSTEM AND SENSE ORGANS: Chronic | Status: ACTIVE | Noted: 2021-12-06

## 2022-02-14 PROBLEM — I25.10 ATHEROSCLEROTIC HEART DISEASE OF NATIVE CORONARY ARTERY WITHOUT ANGINA PECTORIS: Chronic | Status: ACTIVE | Noted: 2021-12-06

## 2022-02-14 PROBLEM — K22.5 DIVERTICULUM OF ESOPHAGUS, ACQUIRED: Chronic | Status: ACTIVE | Noted: 2021-12-06

## 2022-02-14 PROBLEM — Z87.19 PERSONAL HISTORY OF OTHER DISEASES OF THE DIGESTIVE SYSTEM: Chronic | Status: ACTIVE | Noted: 2021-12-06

## 2022-02-14 PROCEDURE — 92083 EXTENDED VISUAL FIELD XM: CPT

## 2022-02-14 PROCEDURE — 92012 INTRM OPH EXAM EST PATIENT: CPT

## 2022-03-10 ENCOUNTER — NON-APPOINTMENT (OUTPATIENT)
Age: 87
End: 2022-03-10

## 2022-03-10 ENCOUNTER — APPOINTMENT (OUTPATIENT)
Dept: OPHTHALMOLOGY | Facility: CLINIC | Age: 87
End: 2022-03-10
Payer: MEDICARE

## 2022-03-10 PROCEDURE — 92133 CPTRZD OPH DX IMG PST SGM ON: CPT

## 2022-03-10 PROCEDURE — 92014 COMPRE OPH EXAM EST PT 1/>: CPT

## 2022-09-15 ENCOUNTER — APPOINTMENT (OUTPATIENT)
Dept: OPHTHALMOLOGY | Facility: CLINIC | Age: 87
End: 2022-09-15

## 2022-10-30 ENCOUNTER — EMERGENCY (EMERGENCY)
Facility: HOSPITAL | Age: 87
LOS: 1 days | Discharge: HOME | End: 2022-10-30
Attending: STUDENT IN AN ORGANIZED HEALTH CARE EDUCATION/TRAINING PROGRAM
Payer: MEDICARE

## 2022-10-30 VITALS
TEMPERATURE: 99 F | HEART RATE: 73 BPM | OXYGEN SATURATION: 100 % | DIASTOLIC BLOOD PRESSURE: 92 MMHG | SYSTOLIC BLOOD PRESSURE: 137 MMHG | RESPIRATION RATE: 17 BRPM

## 2022-10-30 VITALS
OXYGEN SATURATION: 95 % | RESPIRATION RATE: 20 BRPM | TEMPERATURE: 98 F | WEIGHT: 128.09 LBS | HEART RATE: 62 BPM | DIASTOLIC BLOOD PRESSURE: 65 MMHG | SYSTOLIC BLOOD PRESSURE: 137 MMHG | HEIGHT: 63 IN

## 2022-10-30 DIAGNOSIS — Z96.659 PRESENCE OF UNSPECIFIED ARTIFICIAL KNEE JOINT: Chronic | ICD-10-CM

## 2022-10-30 DIAGNOSIS — Z86.69 PERSONAL HISTORY OF OTHER DISEASES OF THE NERVOUS SYSTEM AND SENSE ORGANS: Chronic | ICD-10-CM

## 2022-10-30 DIAGNOSIS — M19.90 UNSPECIFIED OSTEOARTHRITIS, UNSPECIFIED SITE: Chronic | ICD-10-CM

## 2022-10-30 DIAGNOSIS — Z98.890 OTHER SPECIFIED POSTPROCEDURAL STATES: Chronic | ICD-10-CM

## 2022-10-30 LAB
ALBUMIN SERPL ELPH-MCNC: 4 G/DL — SIGNIFICANT CHANGE UP (ref 3.3–5)
ALP SERPL-CCNC: 55 U/L — SIGNIFICANT CHANGE UP (ref 40–120)
ALT FLD-CCNC: 19 U/L — SIGNIFICANT CHANGE UP (ref 10–45)
ANION GAP SERPL CALC-SCNC: 12 MMOL/L — SIGNIFICANT CHANGE UP (ref 5–17)
APPEARANCE UR: CLEAR — SIGNIFICANT CHANGE UP
APTT BLD: 29.6 SEC — SIGNIFICANT CHANGE UP (ref 27.5–35.5)
AST SERPL-CCNC: 26 U/L — SIGNIFICANT CHANGE UP (ref 10–40)
BACTERIA # UR AUTO: NEGATIVE — SIGNIFICANT CHANGE UP
BASOPHILS # BLD AUTO: 0.05 K/UL — SIGNIFICANT CHANGE UP (ref 0–0.2)
BASOPHILS NFR BLD AUTO: 0.5 % — SIGNIFICANT CHANGE UP (ref 0–2)
BILIRUB SERPL-MCNC: 0.5 MG/DL — SIGNIFICANT CHANGE UP (ref 0.2–1.2)
BILIRUB UR-MCNC: NEGATIVE — SIGNIFICANT CHANGE UP
BUN SERPL-MCNC: 22 MG/DL — SIGNIFICANT CHANGE UP (ref 7–23)
CALCIUM SERPL-MCNC: 9.2 MG/DL — SIGNIFICANT CHANGE UP (ref 8.4–10.5)
CHLORIDE SERPL-SCNC: 101 MMOL/L — SIGNIFICANT CHANGE UP (ref 96–108)
CO2 SERPL-SCNC: 25 MMOL/L — SIGNIFICANT CHANGE UP (ref 22–31)
COLOR SPEC: YELLOW — SIGNIFICANT CHANGE UP
CREAT SERPL-MCNC: 0.75 MG/DL — SIGNIFICANT CHANGE UP (ref 0.5–1.3)
DIFF PNL FLD: NEGATIVE — SIGNIFICANT CHANGE UP
EGFR: 85 ML/MIN/1.73M2 — SIGNIFICANT CHANGE UP
EOSINOPHIL # BLD AUTO: 0.06 K/UL — SIGNIFICANT CHANGE UP (ref 0–0.5)
EOSINOPHIL NFR BLD AUTO: 0.6 % — SIGNIFICANT CHANGE UP (ref 0–6)
EPI CELLS # UR: 1 /HPF — SIGNIFICANT CHANGE UP
GLUCOSE SERPL-MCNC: 179 MG/DL — HIGH (ref 70–99)
GLUCOSE UR QL: NEGATIVE — SIGNIFICANT CHANGE UP
HCT VFR BLD CALC: 40.2 % — SIGNIFICANT CHANGE UP (ref 39–50)
HGB BLD-MCNC: 13.3 G/DL — SIGNIFICANT CHANGE UP (ref 13–17)
HYALINE CASTS # UR AUTO: 7 /LPF — HIGH (ref 0–2)
IMM GRANULOCYTES NFR BLD AUTO: 0.4 % — SIGNIFICANT CHANGE UP (ref 0–0.9)
INR BLD: 1.16 RATIO — SIGNIFICANT CHANGE UP (ref 0.88–1.16)
KETONES UR-MCNC: NEGATIVE — SIGNIFICANT CHANGE UP
LEUKOCYTE ESTERASE UR-ACNC: NEGATIVE — SIGNIFICANT CHANGE UP
LYMPHOCYTES # BLD AUTO: 1.53 K/UL — SIGNIFICANT CHANGE UP (ref 1–3.3)
LYMPHOCYTES # BLD AUTO: 15.5 % — SIGNIFICANT CHANGE UP (ref 13–44)
MCHC RBC-ENTMCNC: 33.1 GM/DL — SIGNIFICANT CHANGE UP (ref 32–36)
MCHC RBC-ENTMCNC: 33.8 PG — SIGNIFICANT CHANGE UP (ref 27–34)
MCV RBC AUTO: 102.3 FL — HIGH (ref 80–100)
MONOCYTES # BLD AUTO: 0.65 K/UL — SIGNIFICANT CHANGE UP (ref 0–0.9)
MONOCYTES NFR BLD AUTO: 6.6 % — SIGNIFICANT CHANGE UP (ref 2–14)
NEUTROPHILS # BLD AUTO: 7.54 K/UL — HIGH (ref 1.8–7.4)
NEUTROPHILS NFR BLD AUTO: 76.4 % — SIGNIFICANT CHANGE UP (ref 43–77)
NITRITE UR-MCNC: NEGATIVE — SIGNIFICANT CHANGE UP
NRBC # BLD: 0 /100 WBCS — SIGNIFICANT CHANGE UP (ref 0–0)
PH UR: 5.5 — SIGNIFICANT CHANGE UP (ref 5–8)
PLATELET # BLD AUTO: 145 K/UL — LOW (ref 150–400)
POTASSIUM SERPL-MCNC: 4.5 MMOL/L — SIGNIFICANT CHANGE UP (ref 3.5–5.3)
POTASSIUM SERPL-SCNC: 4.5 MMOL/L — SIGNIFICANT CHANGE UP (ref 3.5–5.3)
PROT SERPL-MCNC: 7.4 G/DL — SIGNIFICANT CHANGE UP (ref 6–8.3)
PROT UR-MCNC: ABNORMAL
PROTHROM AB SERPL-ACNC: 13.4 SEC — SIGNIFICANT CHANGE UP (ref 10.5–13.4)
RBC # BLD: 3.93 M/UL — LOW (ref 4.2–5.8)
RBC # FLD: 11.9 % — SIGNIFICANT CHANGE UP (ref 10.3–14.5)
RBC CASTS # UR COMP ASSIST: 5 /HPF — HIGH (ref 0–4)
SODIUM SERPL-SCNC: 138 MMOL/L — SIGNIFICANT CHANGE UP (ref 135–145)
SP GR SPEC: 1.03 — HIGH (ref 1.01–1.02)
TROPONIN T, HIGH SENSITIVITY RESULT: 31 NG/L — SIGNIFICANT CHANGE UP (ref 0–51)
TROPONIN T, HIGH SENSITIVITY RESULT: 34 NG/L — SIGNIFICANT CHANGE UP (ref 0–51)
UROBILINOGEN FLD QL: NEGATIVE — SIGNIFICANT CHANGE UP
WBC # BLD: 9.87 K/UL — SIGNIFICANT CHANGE UP (ref 3.8–10.5)
WBC # FLD AUTO: 9.87 K/UL — SIGNIFICANT CHANGE UP (ref 3.8–10.5)
WBC UR QL: 3 /HPF — SIGNIFICANT CHANGE UP (ref 0–5)

## 2022-10-30 PROCEDURE — 85025 COMPLETE CBC W/AUTO DIFF WBC: CPT

## 2022-10-30 PROCEDURE — G1004: CPT

## 2022-10-30 PROCEDURE — 70450 CT HEAD/BRAIN W/O DYE: CPT | Mod: 26,MG

## 2022-10-30 PROCEDURE — 99285 EMERGENCY DEPT VISIT HI MDM: CPT | Mod: 25

## 2022-10-30 PROCEDURE — 71045 X-RAY EXAM CHEST 1 VIEW: CPT | Mod: 26

## 2022-10-30 PROCEDURE — 99285 EMERGENCY DEPT VISIT HI MDM: CPT | Mod: GC

## 2022-10-30 PROCEDURE — 87086 URINE CULTURE/COLONY COUNT: CPT

## 2022-10-30 PROCEDURE — 80053 COMPREHEN METABOLIC PANEL: CPT

## 2022-10-30 PROCEDURE — 84484 ASSAY OF TROPONIN QUANT: CPT

## 2022-10-30 PROCEDURE — 93010 ELECTROCARDIOGRAM REPORT: CPT

## 2022-10-30 PROCEDURE — 71045 X-RAY EXAM CHEST 1 VIEW: CPT

## 2022-10-30 PROCEDURE — 81001 URINALYSIS AUTO W/SCOPE: CPT

## 2022-10-30 PROCEDURE — 70450 CT HEAD/BRAIN W/O DYE: CPT | Mod: MG

## 2022-10-30 PROCEDURE — 85730 THROMBOPLASTIN TIME PARTIAL: CPT

## 2022-10-30 PROCEDURE — 93005 ELECTROCARDIOGRAM TRACING: CPT

## 2022-10-30 PROCEDURE — 85610 PROTHROMBIN TIME: CPT

## 2022-10-30 NOTE — ED ADULT NURSE NOTE - OBJECTIVE STATEMENT
89 y/o M w/ PMH of TAVR on tuesday , on Eliquis, hypothyroidism, glaucoma and cataracts, presents to ED complaining of dizziness and a fall. Pt reports feeling dizzy for the past 3 weeks. Pt fell this morning and landed on his left elbow. Pt denies loosing consciousness or hitting his head. It was reported there were no complications with TAVR procedure. Pt also reports unintentional weight loss. Upon assessment, left elbow is edematous with an abrasion. Pt has sensation in the left digits, full ROM of arm and strong bilateral radial pulses. Denies headache, dizziness, vision changes, chest pain, shortness of breath, abdominal pain, nausea, vomiting, diarrhea, fevers, chills, dysuria, hematuria, recent illness travel. 89 y/o M w/ PMH of TAVR on tuesday , on Eliquis, hypothyroidism, glaucoma and cataracts, presents to ED complaining of dizziness and a fall. Pt reports feeling dizzy for the past 3 weeks. Pt fell this morning and landed on his left elbow. Pt denies loosing consciousness or hitting his head. It was reported there were no complications with TAVR procedure. Pt also reports unintentional weight loss. Upon assessment, left elbow is edematous with an abrasion w/ slight bleeding. Pt has sensation in the left digits, full ROM of arm, full strength and strong bilateral radial pulses. Denies headache, dizziness, vision changes, chest pain, shortness of breath, abdominal pain, nausea, vomiting, diarrhea, fevers, chills, dysuria, hematuria, recent illness travel.

## 2022-10-30 NOTE — ED ADULT NURSE REASSESSMENT NOTE - NS ED NURSE REASSESS COMMENT FT1
Pt is A&Ox3, denies any pain, dizziness, or weakness. Discharge instructions provided and pt verbalized understanding. IV removed and vitals taken. Pt ambulates independently.

## 2022-10-30 NOTE — ED PROVIDER NOTE - CARE PLAN
1 Principal Discharge DX:	Contusion   Principal Discharge DX:	Contusion  Secondary Diagnosis:	Dizziness

## 2022-10-30 NOTE — ED PROVIDER NOTE - PATIENT PORTAL LINK FT
You can access the FollowMyHealth Patient Portal offered by [facility] by registering at the following website: [email or web address]. By joining [employer]’s FollowMyHealth portal, you will also be able to view your health information using other applications (apps) compatible with our system.

## 2022-10-30 NOTE — ED ADULT NURSE REASSESSMENT NOTE - NS ED NURSE REASSESS COMMENT FT1
Pt ambulated with RN as per MD order, denies any weakness or dizziness while ambulating. MD Joy made aware and awaiting discharge instructions.

## 2022-10-30 NOTE — ED ADULT NURSE NOTE - NSFALLRSKPASTHIST_ED_ALL_ED
SUBJECTIVE:   Wilda Natarajan is a 66 year old female who presents to clinic today for the following health issues:    Diabetes Follow-up    Patient is checking blood sugars: once daily.  Results are as follows:       am - 120-130    Diabetic concerns: None     Symptoms of hypoglycemia (low blood sugar): none     Paresthesias (numbness or burning in feet) or sores: No   Date of last diabetic eye exam: Will be seeing the eye doctor in November 2017.       Amount of exercise or physical activity: 4-5 days/week for an average of 30-45 minutes    Problems taking medications regularly: No    Medication side effects: none  Diet: regular (no restrictions)      PROBLEMS TO ADD ON...    Problem list and histories reviewed & adjusted, as indicated.  Additional history: as documented    1. Type 2 diabetes mellitus with diabetic neuropathy, without long-term current use of insulin (H)    2. Screen for colon cancer    3. Asymptomatic postmenopausal status    4. At risk for falling    5. Essential hypertension    6. Hyperlipidemia LDL goal <100    7. Hypothyroidism due to acquired atrophy of thyroid    8. Primary osteoarthritis of both knees    9. Need for prophylactic vaccination and inoculation against influenza         Reviewed and updated as needed this visit by clinical staffTobacco  Allergies  Meds  Med Hx  Surg Hx  Fam Hx  Soc Hx      Reviewed and updated as needed this visit by Provider         1. Type 2 diabetes mellitus with diabetic neuropathy, without long-term current use of insulin (H)    2. Screen for colon cancer    3. Asymptomatic postmenopausal status    4. At risk for falling    5. Essential hypertension    6. Hyperlipidemia LDL goal <100    7. Hypothyroidism due to acquired atrophy of thyroid    8. Primary osteoarthritis of both knees    9. Need for prophylactic vaccination and inoculation against influenza        PMH: Updated and/or reviewed in chart.    PSH: Updated and/or reviewed in chart.    Family  "History: Updated and/or reviewed in chart.     ROS:  Constitutional, HEENT, cardiovascular, pulmonary, gi and gu, neuro and derm systems are otherwise negative.     OBJECTIVE:                                                    /62  Pulse 76  Temp 98.3  F (36.8  C) (Tympanic)  Ht 5' 8\" (1.727 m)  Wt 211 lb 9.6 oz (96 kg)  BMI 32.17 kg/m2  GENERAL: Pleasant and interactive.  Alert and oriented x 3.  No acute distress.  PSYCH: Alert and oriented times 3; coherent speech, normal rate and volume, able to articulate logical thoughts, able to abstract reason, no tangential thoughts, no hallucinations or delusions  Her affect is normal.  FEET: both sides normal; no swelling, tenderness or skin or vascular lesions.  Sensation is intact. Peripheral pulses are palpable. Toenails are normal.      Results for orders placed or performed in visit on 10/13/17   HEMOGLOBIN A1C   Result Value Ref Range    Hemoglobin A1C 6.6 (H) 4.3 - 6.0 %      ASSESSMENT/PLAN:                                                        ICD-10-CM    1. Type 2 diabetes mellitus with diabetic neuropathy, without long-term current use of insulin (H) E11.40 HEMOGLOBIN A1C   2. Screen for colon cancer Z12.11    3. Asymptomatic postmenopausal status Z78.0 DEXA HIP/PELVIS/SPINE - Future   4. At risk for falling Z91.81    5. Essential hypertension I10    6. Hyperlipidemia LDL goal <100 E78.5    7. Hypothyroidism due to acquired atrophy of thyroid E03.4    8. Primary osteoarthritis of both knees M17.0    9. Need for prophylactic vaccination and inoculation against influenza Z23 FLU VAC, SPLIT VIRUS IM > 3 YO (QUADRIVALENT) [89029]     ADMIN INFLUENZA (For MEDICARE Patients ONLY) []       Care plan updated in chart for chronic problems.    Patient Instructions     Make appointment for your colonosocpy and bone density testing when you get back from [place].    Diabetic Goals:          Hgb A1C (6wk avg)             <7     Lab Results   Component " "Value Date    A1C 6.6 10/13/2017     LDL (bad) Cholesterol         <100  Lab Results   Component Value Date    LDL 53 06/16/2017       Blood Pressure                <130/<80   BP Readings from Last 1 Encounters:   10/13/17 126/62     Body Mass Index               <27   Estimated body mass index is 32.17 kg/(m^2) as calculated from the following:    Height as of this encounter: 5' 8\" (1.727 m).    Weight as of this encounter: 211 lb 9.6 oz (96 kg).    Blood Sugar  (fasting)        70 -105  Blood Sugar (pre-meal)         Blood Sugar (2hr after)        100-160    Daily:    Blood sugar check(s) if instructed.    Self foot examination for sores, calluses, sensation, etc.    Exercise or physical activity    Aspirin  mg (ask your physician prior to starting)    Doctor Visits:    Every 3 months (may go to 6 months if all goals met and stable.)    Hgb A1C every visit (can be done non-fasting at visit)    Foot exams every visit.    Fasting cholesterol if changes have been made in the last 3 months.    Yearly:    Dilated eye exams.    Urine test for microalbumin (checks for early kidney damage).    Flu Vaccine every Fall.    Fasting cholesterol (if well controlled)    Diabetic Education update (insurance usually covers 1 hour a year).     Every 10 Years:    Pneumococcal Vaccine.    Tetanus Booster.      Orders Placed This Encounter     HEMOGLOBIN A1C        BMI:   Estimated body mass index is 32.17 kg/(m^2) as calculated from the following:    Height as of this encounter: 5' 8\" (1.727 m).    Weight as of this encounter: 211 lb 9.6 oz (96 kg).   Weight management plan: Discussed healthy diet and exercise guidelines and patient will follow up in 3 months in clinic to re-evaluate.        See Patient Instructions    Mukesh Arguelles MD      " yes

## 2022-10-30 NOTE — ED PROVIDER NOTE - NSFOLLOWUPINSTRUCTIONS_ED_ALL_ED_FT
Please follow up with your cardiologist and ophthalmologist within 1 week.  Do not drive until you see your doctor.  Use your cane/walker to get around at home.   A number for neurology is also provided for you to schedule appointment.     Fall Prevention    WHAT YOU NEED TO KNOW:    Fall prevention includes ways to make your home and other areas safer. It also includes ways you can move more carefully to prevent a fall. Health conditions that cause changes in your blood pressure, vision, or muscle strength and coordination may increase your risk for falls. Medicines may also increase your risk for falls if they make you dizzy, weak, or sleepy.     DISCHARGE INSTRUCTIONS:    Call 911 or have someone else call if:     You have fallen and are unconscious.      You have fallen and cannot move part of your body.    Contact your healthcare provider if:     You have fallen and have pain or a headache.      You have questions or concerns about your condition or care.    Fall prevention tips:     Stand or sit up slowly. This may help you keep your balance and prevent falls.      Use assistive devices as directed. Your healthcare provider may suggest that you use a cane or walker to help you keep your balance. You may need to have grab bars put in your bathroom near the toilet or in the shower.      Wear shoes that fit well and have soles that . Wear shoes both inside and outside. Use slippers with good . Do not wear shoes with high heels.      Wear a personal alarm. This is a device that allows you to call 911 if you fall and need help. Ask your healthcare provider for more information.      Stay active. Exercise can help strengthen your muscles and improve your balance. Your healthcare provider may recommend water aerobics or walking. He or she may also recommend physical therapy to improve your coordination. Never start an exercise program without talking to your healthcare provider first.       Manage your medical conditions. Keep all appointments with your healthcare providers. Visit your eye doctor as directed.    Home safety tips:     Add items to prevent falls in the bathroom. Put nonslip strips on your bath or shower floor to prevent you from slipping. Use a bath mat if you do not have carpet in the bathroom. This will prevent you from falling when you step out of the bath or shower. Use a shower seat so you do not need to stand while you shower. Sit on the toilet or a chair in your bathroom to dry yourself and put on clothing. This will prevent you from losing your balance from drying or dressing yourself while you are standing.       Keep paths clear. Remove books, shoes, and other objects from walkways and stairs. Place cords for telephones and lamps out of the way so that you do not need to walk over them. Tape them down if you cannot move them. Remove small rugs. If you cannot remove a rug, secure it with double-sided tape. This will prevent you from tripping.       Install bright lights in your home. Use night lights to help light paths to the bathroom or kitchen. Always turn on the light before you start walking.      Keep items you use often on shelves within reach. Do not use a step stool to help you reach an item.      Paint or place reflective tape on the edges of your stairs. This will help you see the stairs better.    Follow up with your healthcare provider as directed: Write down your questions so you remember to ask them during your visits.     Dizziness    Dizziness can manifest as a feeling of unsteadiness or light-headedness. You may feel like you are about to faint. This condition can be caused by a number of things, including medicines, dehydration, or illness. Drink enough fluid to keep your urine clear or pale yellow. Do not drink alcohol and limit your caffeine intake. Avoid quick or sudden movements.  Rise slowly from chairs and steady yourself until you feel okay. In the morning, first sit up on the side of the bed.    SEEK IMMEDIATE MEDICAL CARE IF YOU HAVE ANY OF THE FOLLOWING SYMPTOMS: vomiting, changes in your vision or speech, weakness in your arms or legs, trouble speaking or swallowing, chest pain, abdominal pain, shortness of breath, sweating, bleeding, headache, neck pain, or fever.     Syncope    Syncope is when you temporarily lose consciousness, also called fainting or passing out. It is caused by a sudden decrease in blood flow to the brain. Even though most causes of syncope are not dangerous, syncope can possibly be a sign of a serious medical problem. Signs that you may be about to faint include feeling dizzy, lightheaded, nausea, visual changes, or cold/clammy skin. Do not drive, operate heavy machinery, or play sports until your health care provider says it is okay.    SEEK IMMEDIATE MEDICAL CARE IF YOU HAVE ANY OF THE FOLLOWING SYMPTOMS: severe headache, pain in your chest/abdomen/back, bleeding from your mouth or rectum, palpitations, shortness of breath, pain with breathing, seizure, confusion, or trouble walking.

## 2022-10-30 NOTE — ED PROVIDER NOTE - RATE
Peripheral Nerve Block Procedure Note    Staff:     Anesthesiologist:  Dank Melton MD  Location: OR AFTER induction  Procedure Start/Stop TImes:      9/25/2019 10:42 AM    patient identified, IV checked, site marked, risks and benefits discussed, informed consent, monitors and equipment checked, pre-op evaluation, at physician/surgeon's request and post-op pain management      Correct Patient: Yes      Correct Position: Yes      Correct Site: Yes      Correct Procedure: Yes      Correct Laterality:  Yes    Site Marked:  Yes  Procedure details:     Procedure:  Interscalene    ASA:  3    Diagnosis:  L knee surgery    Laterality:  Left    Position:  Supine    Sterile Prep: chloraprep      Needle:  Short bevel    Needle gauge:  21    Needle length (inches):  4    Ultrasound: Yes      Ultrasound used to identify targeted nerve, plexus, or vascular structure and placed a needle adjacent to it      Permanent Image entered into patiient's record      Abnormal pain on injection: No      Blood Aspirated: No      Paresthesias:  No    Bleeding at site: No      Bolus via:  Needle    Infusion Method:  Single Shot    Complications:  None  Assessment/Narrative:      Pt scare of needle. I agreed to do the block after induction        
65

## 2022-10-30 NOTE — ED PROVIDER NOTE - NSFOLLOWUPCLINICS_GEN_ALL_ED_FT
[facility]  Neurology  [address] [place] [address] [address] [place] [address]  Phone: [phone]  Fax:

## 2022-10-30 NOTE — ED ADULT NURSE NOTE - ED STAT RN HANDOFF DETAILS
Report given to Jill Yeung and Peggy YEUNG. Aware of plan of care. No further questions at this time.

## 2022-10-30 NOTE — ED ADULT NURSE NOTE - TEMPLATE LIST FOR HEAD TO TOE ASSESSMENT
Patient currently having tremors associated with diaphoresis. Accu check 190 mg/dL. Deep breathing exercises encouraged along with relaxation techniques with no relief. Informed Ryan Rae NP. New orders noted of one time dose of demerol. Patient tolerated well. VSS. No s/s of acute distress noted after medication administered. Patient resting comfortably Will continue to monitor.    VIEW ALL

## 2022-10-30 NOTE — ED PROVIDER NOTE - PROGRESS NOTE DETAILS
Cate Joy DO (PGY2): pt signed out to me by day team. States sx are improved. Ambulating on his own in the ED. Plan for dc with cards, neuro and optho f/u. Told pt he should not drive until he sees his other doctors. Son at bedside to take pt home. Pt made aware of lab and imaging results. Questions regarding their symptoms were addressed.  Given strict return precautions. Pt verbalized understanding.

## 2022-10-30 NOTE — ED PROVIDER NOTE - ATTENDING CONTRIBUTION TO CARE
I, Roxana Adams, performed a history and physical exam of the patient and discussed their management with the resident, student, and/or fellow. I reviewed the note and agree with the documented findings and plan of care. I was present and available for all procedures.    ---    89M with recent tavr 1 week ago presented for dizziness and fall. Accompanied by son who states he has been dizzy prior to his tavr. I, Roxana Adams, performed a history and physical exam of the patient and discussed their management with the resident, student, and/or fellow. I reviewed the note and agree with the documented findings and plan of care. I was present and available for all procedures.    ---    89M with recent tavr 1 week ago presented for dizziness and fall. Accompanied by son who states he has been dizzy prior to his tavr but felt dizzy while driving yesterday which resolved and then today when walking inside his home and fell on his elbow without LOC. No other symptoms. On exam, NAD, aox4 with small contusion over his left elbow with no obvious deformity or neuro deficits. Lungs: CTAB. CVS: RRR. Abd soft and nontender. No midline tenderness of the c,t,l spine. Concern for vertigo, stroke, arrhythmia, acs, electrolyte abnormalities, glaucoma, cataract. Plan: labs, imaging, ambulation.

## 2022-10-30 NOTE — ED ADULT NURSE NOTE - CHIEF COMPLAINT QUOTE
s/p TAVR 1 wk ago. Fell after getting dizzy. Injury to l elbow/swelling. A&Ox4. Denies chest pain. Pt on eliquis currently

## 2022-10-30 NOTE — ED PROVIDER NOTE - OBJECTIVE STATEMENT
89yo man with PMH HTN, CAD, afib Eliquis, presenting post fall. Yesterday had an episode of dizziness when driving, resolved on its own. Today felt dizziness again, lost his balance, landed on his left elbow, denies head trauma with LOC, remembers falling. Had to call so to come to help him stand. +left elbow bruise. Denies complete LOC. Had TAVR done with Dr. Matos at [facility] last week. Patient has been having balance problems since before the tavr, dizziness for about 3 weeks. +bilateral glaucoma/cataracts, has had right eye surgery which has improved symptoms. Denies chest pain, syncope. 89yo man with PMH HTN, CAD, afib Eliquis, presenting post fall. Yesterday had an episode of dizziness when driving, resolved on its own. Today felt dizziness again, lost his balance, landed on his left elbow, denies head trauma with LOC, remembers falling. Had to call so to come to help him stand. +left elbow bruise. Denies complete LOC. Had TAVR done with Dr. Matos at [facility] last week. Patient has been having balance problems since before the tavr, dizziness for about 3 weeks. +bilateral glaucoma/cataracts, has had right eye surgery which has improved symptoms. Denies chest pain, syncope, recent illness, nausea, vomiting, abdominal pain, diarrhea.

## 2022-10-30 NOTE — ED PROVIDER NOTE - CLINICAL SUMMARY MEDICAL DECISION MAKING FREE TEXT BOX
Lee, PGY2 - presyncope earlier today, prior episodes of dizziness over the last few weeks. labs, ct head, reassess. *The above represents an initial assessment/impression. Please refer to progress notes for potential changes in patient clinical course*

## 2022-10-30 NOTE — ED ADULT NURSE REASSESSMENT NOTE - NS ED NURSE REASSESS COMMENT FT1
Report received by CATHIE Leroy and Rajat. Pt received A&Ox4, currently denying any pain, dizziness, or SOB. Returned from CT scan and repeat labs sent, awaiting CT and lab results. Vital signs retaken, MD Adams made aware of elevated BP (142/74) pt currently asymptomatic. Pt updated on plan of care and verbalized understanding. Bed in lowest position, side rails up, call bell within reach.

## 2022-10-30 NOTE — ED ADULT TRIAGE NOTE - CHIEF COMPLAINT QUOTE
s/p mitral valve surg 1 wk ago. Fell after getting dizzy. Injury to elbow. A&Ox4. Denies chest pain s/p TAVR 1 wk ago. Fell after getting dizzy. Injury to l elbow/swelling. A&Ox4. Denies chest pain. Pt on eliquis currently

## 2022-10-31 LAB
CULTURE RESULTS: SIGNIFICANT CHANGE UP
SPECIMEN SOURCE: SIGNIFICANT CHANGE UP

## 2023-03-15 NOTE — H&P PST ADULT - SPO2 (%)
Alert-The patient is alert, awake and responds to voice. The patient is oriented to time, place, and person. The triage nurse is able to obtain subjective information.
97

## 2023-05-10 NOTE — ED ADULT TRIAGE NOTE - ACCOMPANIED BY
Colonoscopy 8/2 at 8am arrive for 7am instructions reviewed and patient states understanding. Copy mailed   and address verified.    Self

## 2023-06-02 ENCOUNTER — OFFICE (OUTPATIENT)
Dept: URBAN - METROPOLITAN AREA CLINIC 90 | Facility: CLINIC | Age: 88
Setting detail: OPHTHALMOLOGY
End: 2023-06-02
Payer: MEDICARE

## 2023-06-02 ENCOUNTER — RX ONLY (RX ONLY)
Age: 88
End: 2023-06-02

## 2023-06-02 DIAGNOSIS — H40.1132: ICD-10-CM

## 2023-06-02 DIAGNOSIS — H02.403: ICD-10-CM

## 2023-06-02 DIAGNOSIS — H16.223: ICD-10-CM

## 2023-06-02 DIAGNOSIS — H25.89: ICD-10-CM

## 2023-06-02 DIAGNOSIS — H25.12: ICD-10-CM

## 2023-06-02 PROCEDURE — 92250 FUNDUS PHOTOGRAPHY W/I&R: CPT | Performed by: OPHTHALMOLOGY

## 2023-06-02 PROCEDURE — 99204 OFFICE O/P NEW MOD 45 MIN: CPT | Performed by: OPHTHALMOLOGY

## 2023-06-02 ASSESSMENT — CONFRONTATIONAL VISUAL FIELD TEST (CVF)
OD_FINDINGS: FULL
OS_FINDINGS: FULL

## 2023-06-02 ASSESSMENT — SUPERFICIAL PUNCTATE KERATITIS (SPK)
OD_SPK: 1+
OS_SPK: 1+

## 2023-06-02 ASSESSMENT — LID POSITION - PTOSIS
OS_PTOSIS: LUL
OD_PTOSIS: RUL

## 2023-06-03 PROBLEM — H40.1133 POAG; BOTH EYES SEVERE: Status: ACTIVE | Noted: 2023-06-02

## 2023-06-03 ASSESSMENT — REFRACTION_CURRENTRX
OS_AXIS: 093
OS_CYLINDER: -0.50
OS_OVR_VA: 20/
OS_SPHERE: +1.25
OD_AXIS: 088
OD_OVR_VA: 20/
OS_VPRISM_DIRECTION: BF
OD_VPRISM_DIRECTION: BF
OD_ADD: +2.25
OD_SPHERE: +1.50
OD_CYLINDER: -1.75
OS_ADD: +2.25

## 2023-06-03 ASSESSMENT — SPHEQUIV_DERIVED
OD_SPHEQUIV: 0.25
OS_SPHEQUIV: 1
OD_SPHEQUIV: 1.25

## 2023-06-03 ASSESSMENT — KERATOMETRY
OD_K2POWER_DIOPTERS: 43.50
OD_AXISANGLE_DEGREES: 003
OD_K1POWER_DIOPTERS: 40.75
OS_K1POWER_DIOPTERS: UTP

## 2023-06-03 ASSESSMENT — REFRACTION_MANIFEST
OD_AXIS: 080
OS_VA1: 20/50+
OD_CYLINDER: -2.00
OS_SPHERE: +1.25
OS_CYLINDER: -0.50
OD_VA1: 20/25+
OD_VA2: 20/30(J2)-
OD_SPHERE: +1.25
OS_VA2: 20/30(J2)-
OS_AXIS: 090
OS_ADD: +3.00
OD_ADD: +3.00

## 2023-06-03 ASSESSMENT — REFRACTION_AUTOREFRACTION
OS_SPHERE: UTP
OD_CYLINDER: -2.50
OD_AXIS: 088
OD_SPHERE: +2.50

## 2023-06-03 ASSESSMENT — AXIALLENGTH_DERIVED
OD_AL: 24.0067
OD_AL: 23.61

## 2023-06-03 ASSESSMENT — VISUAL ACUITY
OD_BCVA: 20/50-2
OS_BCVA: 20/25+2

## 2023-12-04 NOTE — ED ADULT NURSE NOTE - DRUG PRE-SCREENING (DAST -1)
----- Message from Yoli Bianchi LPN sent at 12/4/2023  8:29 AM CST -----  Regarding: bibi Olsen 12/11 @9:20  Fasting labs needed.     Statement Selected

## 2024-01-01 NOTE — ED ADULT TRIAGE NOTE - DOMESTIC TRAVEL HIGH RISK QUESTION
Continue to alternate motrin/ibuprofen and tylenol ever 4 to 6 hours as needed for pain  Ice area to help with swelling    Follow up with the pediatrician for further management in 7-10 days.   Return to the ER For any worsening symptoms    No

## 2024-01-29 NOTE — ED ADULT NURSE NOTE - CHIEF COMPLAINT QUOTE
As per patient and EMS, pt was swimming and when getting out of pool, pt's right knee was swollen. Pt states that he had knee swelling drained a few weeks ago. Pt denies fevers, chills, or trauma to area.
Neuro

## 2024-02-21 ENCOUNTER — OFFICE (OUTPATIENT)
Dept: URBAN - METROPOLITAN AREA CLINIC 90 | Facility: CLINIC | Age: 89
Setting detail: OPHTHALMOLOGY
End: 2024-02-21
Payer: MEDICARE

## 2024-02-21 DIAGNOSIS — H25.89: ICD-10-CM

## 2024-02-21 DIAGNOSIS — H40.1133: ICD-10-CM

## 2024-02-21 DIAGNOSIS — H02.403: ICD-10-CM

## 2024-02-21 DIAGNOSIS — H52.4: ICD-10-CM

## 2024-02-21 DIAGNOSIS — H43.392: ICD-10-CM

## 2024-02-21 DIAGNOSIS — H16.223: ICD-10-CM

## 2024-02-21 PROCEDURE — 92015 DETERMINE REFRACTIVE STATE: CPT | Performed by: OPHTHALMOLOGY

## 2024-02-21 PROCEDURE — 92014 COMPRE OPH EXAM EST PT 1/>: CPT | Performed by: OPHTHALMOLOGY

## 2024-02-21 PROCEDURE — 92133 CPTRZD OPH DX IMG PST SGM ON: CPT | Performed by: OPHTHALMOLOGY

## 2024-02-21 ASSESSMENT — CONFRONTATIONAL VISUAL FIELD TEST (CVF)
OS_FINDINGS: FULL
OD_FINDINGS: FULL

## 2024-02-21 ASSESSMENT — LID POSITION - PTOSIS
OD_PTOSIS: RUL
OS_PTOSIS: LUL

## 2024-02-21 ASSESSMENT — SUPERFICIAL PUNCTATE KERATITIS (SPK)
OD_SPK: 2+
OS_SPK: 2+

## 2024-02-22 ASSESSMENT — REFRACTION_MANIFEST
OS_SPHERE: +1.00
OS_AXIS: 090
OD_AXIS: 85
OD_VA2: 20/50(J5)
OD_ADD: +3.00
OS_AXIS: 090
OS_SPHERE: +1.00
OD_AXIS: 090
OD_VA1: 20/30-2
OS_ADD: +3.00
OS_VA2: 20/50(J5)
OD_SPHERE: +1.25
OD_CYLINDER: -2.50
OD_VA1: 20/25+
OS_ADD: +3.00
OS_VA1: 20/50+
OD_AXIS: 080
OD_VA2: 20/30(J2)-
OS_SPHERE: +1.25
OS_VA2: 20/30(J2)-
OD_CYLINDER: -2.00
OS_CYLINDER: -2.00
OS_CYLINDER: -2.00
OS_CYLINDER: -0.50
OD_ADD: +3.00
OS_AXIS: 90
OD_SPHERE: +2.00
OS_ADD: +3.00
OD_SPHERE: +1.25
OD_CYLINDER: -0.75
OS_VA1: 20/50-2
OD_ADD: +3.00

## 2024-02-22 ASSESSMENT — SPHEQUIV_DERIVED
OS_SPHEQUIV: 0.25
OS_SPHEQUIV: 0
OD_SPHEQUIV: 0.25
OS_SPHEQUIV: 0
OS_SPHEQUIV: 1
OD_SPHEQUIV: 2.125
OD_SPHEQUIV: 0.875
OD_SPHEQUIV: 0.75

## 2024-02-22 ASSESSMENT — REFRACTION_CURRENTRX
OD_CYLINDER: -0.75
OD_OVR_VA: 20/
OS_ADD: +2.50
OD_SPHERE: +1.50
OS_OVR_VA: 20/
OS_VPRISM_DIRECTION: BF
OD_VPRISM_DIRECTION: BF
OS_OVR_VA: 20/
OS_CYLINDER: -0.50
OS_VPRISM_DIRECTION: BF
OS_CYLINDER: -0.50
OD_SPHERE: +1.25
OD_ADD: +2.50
OS_AXIS: 084
OD_AXIS: 088
OS_SPHERE: +1.25
OS_AXIS: 093
OD_OVR_VA: 20/
OD_ADD: +2.25
OD_AXIS: 085
OS_ADD: +2.25
OS_SPHERE: +1.00
OD_CYLINDER: -1.75
OD_VPRISM_DIRECTION: BF

## 2024-02-22 ASSESSMENT — REFRACTION_AUTOREFRACTION
OS_SPHERE: +1.50
OD_CYLINDER: -2.75
OD_AXIS: 084
OD_SPHERE: +3.50
OS_CYLINDER: -2.50
OS_AXIS: 079

## 2024-03-27 NOTE — ED PROVIDER NOTE - ATTENDING CONTRIBUTION TO CARE
I have reviewed and confirmed nurses' notes for patient's medications, allergies, medical history, and surgical history. I have reviewed this note, the presenting symptoms, and the Chief Complaint and the History of Present Illness as documented, with the other care provider(s) including resident, ACP, and nurses on the patient care team. I have also reviewed this patient's past medical/surgical history and social/family history as reviewed and listed in this electronic medical record.  I agree with the resident/ACP documentation except where noted otherwise in my personal documentation.  See MDM.  --BMM

## 2024-04-24 NOTE — ED ADULT NURSE NOTE - NS PRO AD PATIENT TYPE
The patient is Stable - Low risk of patient condition declining or worsening    Shift Goals  Clinical Goals: monitor spO2, free from fall  Patient Goals: rest and update on POC  Family Goals: family on bedside    Progress made toward(s) clinical / shift goals:  patient will be discharged with O2, Calls appropriately for assistance out of bed.    Patient is not progressing towards the following goals: n/a       Health Care Proxy (HCP)

## 2024-11-07 ENCOUNTER — OFFICE (OUTPATIENT)
Age: 89
Setting detail: OPHTHALMOLOGY
End: 2024-11-07
Payer: MEDICARE

## 2024-11-07 ENCOUNTER — RX ONLY (RX ONLY)
Age: 89
End: 2024-11-07

## 2024-11-07 DIAGNOSIS — H25.89: ICD-10-CM

## 2024-11-07 DIAGNOSIS — H40.1133: ICD-10-CM

## 2024-11-07 DIAGNOSIS — H02.403: ICD-10-CM

## 2024-11-07 DIAGNOSIS — H16.223: ICD-10-CM

## 2024-11-07 DIAGNOSIS — H43.392: ICD-10-CM

## 2024-11-07 PROCEDURE — 92012 INTRM OPH EXAM EST PATIENT: CPT | Performed by: OPHTHALMOLOGY

## 2024-11-07 ASSESSMENT — REFRACTION_MANIFEST
OD_VA2: 20/50(J5)
OS_CYLINDER: -2.00
OD_ADD: +3.00
OD_AXIS: 85
OS_AXIS: 090
OS_VA2: 20/30(J2)-
OD_CYLINDER: -2.00
OD_VA1: 20/30-2
OS_VA2: 20/50(J5)
OS_VA1: 20/50+
OD_ADD: +3.00
OD_CYLINDER: -0.75
OD_ADD: +3.00
OS_AXIS: 90
OD_VA1: 20/25+
OS_SPHERE: +1.00
OD_SPHERE: +1.25
OD_CYLINDER: -2.50
OS_SPHERE: +1.00
OS_ADD: +3.00
OD_SPHERE: +2.00
OS_AXIS: 090
OS_CYLINDER: -2.00
OS_VA1: 20/50-2
OS_ADD: +3.00
OD_SPHERE: +1.25
OS_ADD: +3.00
OD_AXIS: 080
OS_CYLINDER: -0.50
OD_VA2: 20/30(J2)-
OS_SPHERE: +1.25
OD_AXIS: 090

## 2024-11-07 ASSESSMENT — VISUAL ACUITY
OD_BCVA: 20/60+-
OS_BCVA: 20/20-

## 2024-11-07 ASSESSMENT — REFRACTION_CURRENTRX
OS_VPRISM_DIRECTION: BF
OS_AXIS: 084
OD_OVR_VA: 20/
OD_ADD: +2.50
OS_OVR_VA: 20/
OD_AXIS: 088
OD_SPHERE: +1.25
OS_AXIS: 093
OS_OVR_VA: 20/
OS_CYLINDER: -0.50
OS_ADD: +2.50
OD_SPHERE: +1.50
OS_VPRISM_DIRECTION: BF
OD_OVR_VA: 20/
OD_AXIS: 085
OD_VPRISM_DIRECTION: BF
OD_CYLINDER: -0.75
OD_ADD: +2.25
OS_SPHERE: +1.00
OS_CYLINDER: -0.50
OS_SPHERE: +1.25
OD_VPRISM_DIRECTION: BF
OS_ADD: +2.25
OD_CYLINDER: -1.75

## 2024-11-07 ASSESSMENT — REFRACTION_AUTOREFRACTION
OD_SPHERE: +3.50
OS_SPHERE: +1.50
OD_AXIS: 084
OD_CYLINDER: -2.75
OS_AXIS: 079
OS_CYLINDER: -2.50

## 2024-11-07 ASSESSMENT — LID POSITION - PTOSIS
OS_PTOSIS: LUL
OD_PTOSIS: RUL

## 2024-11-07 ASSESSMENT — SUPERFICIAL PUNCTATE KERATITIS (SPK)
OS_SPK: 2+
OD_SPK: 2+

## 2024-11-07 ASSESSMENT — KERATOMETRY
OS_AXISANGLE_DEGREES: 158
OS_K1POWER_DIOPTERS: 41.25
OD_K1POWER_DIOPTERS: UNABLE
OS_K2POWER_DIOPTERS: 43.75

## 2024-11-07 ASSESSMENT — TONOMETRY: OS_IOP_MMHG: 14

## 2025-04-17 NOTE — PATIENT PROFILE ADULT - NSPROPTRIGHTBILLOFRIGHTS_GEN_A_NUR
patient [de-identified] : Indication for procedure:  Unable to adequately examine mid and posterior nasal cavity with anterior rhinoscopy The patient has persistent sinus pressure  Sinus endoscope # 99 Nasal septum exam shows septal deviation to the rt at se 2-3 plus The inferior nasal turbinates are moderately hypertrophic in size bilaterally. The sinus endoscope was introduced into the right nares exam right middle meatus reveals no mucopus or inflammation.  The right middle turbinate is WNL. The scope was advanced and the sphenoethmoid region was inspected. The superior meatus, superior turbinate and nasal vault are unremarkable.  The nasopharynx is unremarkable without inflammation or mass. The sinus endoscope was introduced into the left nares and exam left middle meatus reveals no mucopus or inflammation.  The left middle turbinate is WNL. The scope was advanced and the sphenoethmoid region was inspected. The left superior meatus and nasal vault are unremarkable.